# Patient Record
Sex: FEMALE | Race: WHITE | NOT HISPANIC OR LATINO | Employment: FULL TIME | URBAN - METROPOLITAN AREA
[De-identification: names, ages, dates, MRNs, and addresses within clinical notes are randomized per-mention and may not be internally consistent; named-entity substitution may affect disease eponyms.]

---

## 2023-03-31 ENCOUNTER — HOSPITAL ENCOUNTER (OUTPATIENT)
Dept: DATA CONVERSION | Facility: HOSPITAL | Age: 63
End: 2023-03-31
Attending: ORTHOPAEDIC SURGERY | Admitting: ORTHOPAEDIC SURGERY
Payer: COMMERCIAL

## 2023-03-31 DIAGNOSIS — M65.331 TRIGGER FINGER, RIGHT MIDDLE FINGER: ICD-10-CM

## 2023-03-31 DIAGNOSIS — D21.11 BENIGN NEOPLASM OF CONNECTIVE AND OTHER SOFT TISSUE OF RIGHT UPPER LIMB, INCLUDING SHOULDER: ICD-10-CM

## 2023-03-31 DIAGNOSIS — M65.341 TRIGGER FINGER, RIGHT RING FINGER: ICD-10-CM

## 2023-03-31 DIAGNOSIS — Z87.891 PERSONAL HISTORY OF NICOTINE DEPENDENCE: ICD-10-CM

## 2023-04-10 LAB
ATRIAL RATE: 70 BPM
P AXIS: 32 DEGREES
P OFFSET: 212 MS
P ONSET: 163 MS
PR INTERVAL: 124 MS
Q ONSET: 225 MS
QRS COUNT: 11 BEATS
QRS DURATION: 94 MS
QT INTERVAL: 410 MS
QTC CALCULATION(BAZETT): 442 MS
QTC FREDERICIA: 431 MS
R AXIS: 40 DEGREES
T AXIS: 34 DEGREES
T OFFSET: 430 MS
VENTRICULAR RATE: 70 BPM

## 2023-04-24 LAB
COMPLETE PATHOLOGY REPORT: NORMAL
CONVERTED CLINICAL DIAGNOSIS-HISTORY: NORMAL
CONVERTED FINAL DIAGNOSIS: NORMAL
CONVERTED FINAL REPORT PDF LINK TO COPY AND PASTE: NORMAL
CONVERTED GROSS DESCRIPTION: NORMAL

## 2023-09-14 NOTE — H&P
History & Physical Reviewed:   I have reviewed the History and Physical dated:  06-Mar-2023   History and Physical reviewed and relevant findings noted. Patient examined to review pertinent physical  findings.: No significant changes   Home Medications Reviewed: no changes noted   Allergies Reviewed: no changes noted     Consent:   COVID-19 Consent:  ·  COVID-19 Risk Consent Surgeon has reviewed key risks related to the risk of rachel COVID-19 and if they contract COVID-19 what the risks are.       Electronic Signatures:  Domingo Tran ()  (Signed 31-Mar-2023 07:29)   Authored: History & Physical Reviewed, Consent, Note  Completion      Last Updated: 31-Mar-2023 07:29 by Domingo Tran ()

## 2023-10-02 NOTE — OP NOTE
Post Operative Note:     Post-Procedure Diagnosis: Trigger finger to right  long and ring finger.  Right ring finger mass.   Procedure: 1.   2.   3.   4.   5.   Surgeon: Domingo rTan D.O.   Resident/Fellow/Other Assistant: ROLA Smith   Estimated Blood Loss (mL): Less than 10 cc   Specimen: yes   Findings: Trigger finger to right long and ring finger.   Right ring finger mass.     Operative Report Dictated:  Dictation: not applicable - note contains Operative  Report   Operative Report:    Preoperative diagnosis: Right long finger trigger finger.  Right ring finger trigger finger.  Right ring finger mass overlying A1 pulley.    Postoperative diagnosis: Same    Procedure planned: Right long finger trigger finger release.  Right ring finger trigger finger release.  Excision mass right ring finger, subfascial, measuring 4 mm in maximal dimension.    Procedure performed: Same    Surgeon: Domingo Tran D.O.    Asst.: ROLA Kim  The physician assistant was present to the entire case. Given the nature of the disease process and the procedure to be performed a skilled surgical assistant was necessary during the case. The assistant was necessary in order to hold retractors and directly  assist in the operation. A certified scrub tech was at the back table managing instruments and supplies for the surgical case.    Anesthesia type: Local field block performed with lidocaine with epinephrine solution buffered with bicarbonate monitored by the anesthesia team    Estimated blood loss: Less than 10 mL    Drains: None    Tourniquet: None    Specimens: Excised subfascial mass adherent to A1 pulley of right ring finger was sent for pathology    Implants: None    Indications for surgery: The patient presented with painful triggering of the right long finger.  The patient also had painful triggering to the right ring finger with associated mass in the palm overlying A1 pulley to ring finger.  The patient had  failure  of nonoperative treatment strategies. After full discussion regarding risks benefits and alternatives the patient elected to forego any additional nonoperative management in favor of right long and ring finger trigger finger release with excision mass.    Complications: None noted at time of surgery    Description of procedure: The patient was taken to the operative suite and placed in the supine position on the operating table.  A timeout was performed and the right hand was confirmed to be the operative site.  The patient was carefully positioned  on the table in such a fashion as to pad all bony prominences and peripheral nerves.  The patient was administered appropriate preoperative IV antibiotics.  The patient was then prepped and draped in the normal sterile fashion.  An oblique incision was  then marked out directly overlying the A1 pulley to the right long finger extending to overlie A1 pulley to ring finger.  The 15 blade was used to incise skin.  Starting with the long finger, the tenotomy scissors were used to gently dissect down through  the subcutaneous plane taking care to protect the neurovascular bundles both radially and ulnarly.  The proximal and distal boundaries of the A1 pulley were dissected out and directly visualized.  The tenotomy scissors were then used to release the A1  pulley within its midline.  The hypertrophic palmar aponeurotic pulley was also released within its midline under direct visualization.  Attention was then turned to the ring.  Again tenotomy scissors were used to dissect through the subcutaneous plane  exposing the palmar mass adherent to the A1 pulley of the right ring finger.  The 15 blade was used to incise the pulley at the margins of the mass excising portions of the pulley system and the mass itself.  The mass was sent for pathology.  The mass  itself was subfascial in nature and measured approximately 4 mm in maximal dimension.  Tenotomy scissors were then  used to release the remainder of the A1 pulley and the hypertrophic palmar aponeurotic pulley under direct visualization.  Ragnell retractors  were then used to independently traction FDS and FDP in order to perform traction tenolysis.  The patient was then asked to perform digital range of motion.  The patient was noted to have full range of motion with nice smooth gliding and no persistent  triggering.  Copious irrigation was then performed.  Interrupted nylon stitches were used to close the skin.  A bulky soft dressing was placed.  The patient was then allowed to head to recovery in stable condition.  Overall the patient tolerated the procedure  well.    Disposition: To PACU in stable condition        Electronic Signatures:  Domingo Tran ()  (Signed 31-Mar-2023 09:20)   Authored: Post Operative Note, Note Completion      Last Updated: 31-Mar-2023 09:20 by Domingo Tran ()

## 2023-10-25 PROBLEM — M25.561 ARTHRALGIA OF RIGHT KNEE: Status: ACTIVE | Noted: 2023-10-25

## 2023-10-25 PROBLEM — M65.351 TRIGGER FINGER OF ALL DIGITS OF RIGHT HAND: Status: ACTIVE | Noted: 2023-10-25

## 2023-10-25 PROBLEM — F17.211 CIGARETTE NICOTINE DEPENDENCE IN REMISSION: Status: ACTIVE | Noted: 2023-10-25

## 2023-10-25 PROBLEM — H81.10 BPPV (BENIGN PAROXYSMAL POSITIONAL VERTIGO): Status: ACTIVE | Noted: 2023-10-25

## 2023-10-25 PROBLEM — M65.321 TRIGGER FINGER OF ALL DIGITS OF RIGHT HAND: Status: ACTIVE | Noted: 2023-10-25

## 2023-10-25 PROBLEM — M54.30 SCIATICA: Status: ACTIVE | Noted: 2023-10-25

## 2023-10-25 PROBLEM — M65.341 TRIGGER FINGER OF ALL DIGITS OF RIGHT HAND: Status: ACTIVE | Noted: 2023-10-25

## 2023-10-25 PROBLEM — E55.9 VITAMIN D DEFICIENCY: Status: ACTIVE | Noted: 2023-10-25

## 2023-10-25 PROBLEM — K76.89 HEPATIC CYST: Status: ACTIVE | Noted: 2023-10-25

## 2023-10-25 PROBLEM — M65.311 TRIGGER FINGER OF ALL DIGITS OF RIGHT HAND: Status: ACTIVE | Noted: 2023-10-25

## 2023-10-25 PROBLEM — M65.331 TRIGGER FINGER OF ALL DIGITS OF RIGHT HAND: Status: ACTIVE | Noted: 2023-10-25

## 2023-10-25 PROBLEM — F41.9 ANXIETY: Status: ACTIVE | Noted: 2023-10-25

## 2023-10-25 PROBLEM — E78.5 HYPERLIPIDEMIA: Status: ACTIVE | Noted: 2023-10-25

## 2023-10-25 RX ORDER — CLOBETASOL PROPIONATE 0.5 MG/G
CREAM TOPICAL
COMMUNITY
Start: 2023-07-10 | End: 2024-01-17 | Stop reason: ENTERED-IN-ERROR

## 2023-10-25 RX ORDER — TRETINOIN 0.25 MG/G
CREAM TOPICAL
COMMUNITY

## 2023-10-25 RX ORDER — ACYCLOVIR 400 MG/1
1 TABLET ORAL EVERY 8 HOURS
COMMUNITY
Start: 2023-09-02 | End: 2024-01-17 | Stop reason: ENTERED-IN-ERROR

## 2023-10-25 RX ORDER — CELECOXIB 100 MG/1
1 CAPSULE ORAL 2 TIMES DAILY PRN
COMMUNITY
Start: 2023-06-20 | End: 2024-01-17 | Stop reason: ENTERED-IN-ERROR

## 2023-10-25 RX ORDER — CHOLECALCIFEROL (VITAMIN D3) 25 MCG
1000 TABLET ORAL DAILY
COMMUNITY

## 2023-10-25 RX ORDER — FAMOTIDINE 40 MG/1
1 TABLET, FILM COATED ORAL DAILY PRN
COMMUNITY

## 2023-10-25 RX ORDER — ACYCLOVIR 50 MG/G
CREAM TOPICAL
COMMUNITY
Start: 2023-06-20 | End: 2024-01-17 | Stop reason: ENTERED-IN-ERROR

## 2023-10-26 ENCOUNTER — OFFICE VISIT (OUTPATIENT)
Dept: ORTHOPEDIC SURGERY | Facility: CLINIC | Age: 63
End: 2023-10-26
Payer: COMMERCIAL

## 2023-10-26 DIAGNOSIS — M25.561 ARTHRALGIA OF RIGHT KNEE: Primary | ICD-10-CM

## 2023-10-26 PROCEDURE — 20610 DRAIN/INJ JOINT/BURSA W/O US: CPT | Performed by: ORTHOPAEDIC SURGERY

## 2023-10-26 NOTE — PROGRESS NOTES
History of Present Illness:  Patient returns today for an injection with viscosupplementation. The patient endorsing knee pain refractory to cortisone injections and oral medications    Review of Systems   GENERAL: Negative for malaise, significant weight loss, fever  MUSCULOSKELETAL: see HPI  NEURO:  Negative    Physical Examination:  Trace effusion  Tenderness over medial and lateral joint line    Assessment:  Patient with known osteoarthritis of the knee    Plan:  Visco injection provided, patient tolerated it well. See procedure note.    Injection performed as detailed in the procedure note below.       L Inj/Asp: R knee on 10/26/2023 11:15 AM  Indications: pain  Details: 22 G needle, anteromedial approach  Medications: 48 mg hylan 48 mg/6 mL  Outcome: tolerated well, no immediate complications  Procedure, treatment alternatives, risks and benefits explained, specific risks discussed. Consent was given by the patient. Immediately prior to procedure a time out was called to verify the correct patient, procedure, equipment, support staff and site/side marked as required. Patient was prepped and draped in the usual sterile fashion.

## 2023-11-14 DIAGNOSIS — M25.561 ARTHRALGIA OF RIGHT KNEE: ICD-10-CM

## 2023-12-01 ENCOUNTER — HOSPITAL ENCOUNTER (OUTPATIENT)
Dept: RADIOLOGY | Facility: CLINIC | Age: 63
Discharge: HOME | End: 2023-12-01
Payer: COMMERCIAL

## 2023-12-01 DIAGNOSIS — M25.561 ARTHRALGIA OF RIGHT KNEE: ICD-10-CM

## 2023-12-01 PROCEDURE — 73721 MRI JNT OF LWR EXTRE W/O DYE: CPT | Mod: RIGHT SIDE | Performed by: RADIOLOGY

## 2023-12-01 PROCEDURE — 73721 MRI JNT OF LWR EXTRE W/O DYE: CPT | Mod: RT

## 2023-12-06 ENCOUNTER — APPOINTMENT (OUTPATIENT)
Dept: RADIOLOGY | Facility: CLINIC | Age: 63
End: 2023-12-06
Payer: COMMERCIAL

## 2023-12-28 ENCOUNTER — APPOINTMENT (OUTPATIENT)
Dept: OBSTETRICS AND GYNECOLOGY | Facility: CLINIC | Age: 63
End: 2023-12-28
Payer: COMMERCIAL

## 2023-12-28 ENCOUNTER — TELEPHONE (OUTPATIENT)
Dept: ORTHOPEDIC SURGERY | Facility: CLINIC | Age: 63
End: 2023-12-28

## 2023-12-28 ENCOUNTER — OFFICE VISIT (OUTPATIENT)
Dept: ORTHOPEDIC SURGERY | Facility: CLINIC | Age: 63
End: 2023-12-28
Payer: COMMERCIAL

## 2023-12-28 DIAGNOSIS — M17.11 UNILATERAL PRIMARY OSTEOARTHRITIS, RIGHT KNEE: ICD-10-CM

## 2023-12-28 PROCEDURE — 99214 OFFICE O/P EST MOD 30 MIN: CPT | Performed by: ORTHOPAEDIC SURGERY

## 2023-12-28 NOTE — PROGRESS NOTES
History of Present Illness:  Patient with known osteoarthritis of the knee who presents today for repeat evaluation.  The patient notes persistent pain as well as some occasional mechanical symptoms.  The patient has tried the following modalities: ***.    ***    Review of Systems:   GENERAL: Negative for malaise, significant weight loss, fever  MUSCULOSKELETAL: see HPI  NEURO:  Negative    Physical Examination:  {RIGHT LEFT BILAT:08761} Knee:  Skin healthy and intact  No gross swelling or ecchymosis  Alignment: ***  Effusion: Trace  ROM:  ***  Crepitance with range of motion  No pain with internal rotation of the hip  Tenderness to palpation: over medial and lateral joint line and with patellar compression     No laxity to valgus stress  No laxity to varus stress  Negative Lachman´s test  Negative posterior drawer test  Mild pain with Juani´s test    Neurovascular exam normal distally  2+ DP pulse and good cap refill    Imaging:  Reviewed, degenerative joint disease noted ***    Assessment:  Patient with known osteoarthritis of the {right/left/bilateral:68675} knee    Plan:  We reviewed an evidence-based approach to OA of the knee.  We discussed past treatments as well options for future treatment.  We discussed NSAIDS (risks and benefits), low impact activities.   Discussed metal testing for Nickel allergy if she would like to receive MARIO assisted TKA. She is agreeable to this. We will proceed with metal studies, CT scan, and medical optimization prior to TKA.

## 2023-12-28 NOTE — TELEPHONE ENCOUNTER
12/28/23 - wheeled walker script along with detailed instructional letter mailed to patient for total knee surgery in feb 2024

## 2023-12-28 NOTE — PROGRESS NOTES
History of Present Illness:  Patient with known osteoarthritis of the knee who presents today for repeat evaluation.  The patient notes persistent pain as well as some occasional mechanical symptoms.  The patient has tried the following modalities: Rest, ice, anti-inflammatories as well as corticosteroid injections.    She is here today to discuss arthroplasty.    Review of Systems:   GENERAL: Negative for malaise, significant weight loss, fever  MUSCULOSKELETAL: see HPI  NEURO:  Negative    Physical Examination:  Right Knee:  Skin healthy and intact  No gross swelling or ecchymosis  Alignment: Mild varus  Effusion: Trace  ROM: Full  Crepitance with range of motion  No pain with internal rotation of the hip  Tenderness to palpation: over medial and lateral joint line and with patellar compression     No laxity to valgus stress  No laxity to varus stress  Negative Lachman´s test  Negative posterior drawer test  Mild pain with Juani´s test    Neurovascular exam normal distally  2+ DP pulse and good cap refill    Imaging:  Reviewed, degenerative joint disease noted moderate to severe    Assessment:  Patient with known osteoarthritis of the right knee    Plan:  We reviewed an evidence-based approach to OA of the knee.  We discussed past treatments as well options for future treatment.  We discussed NSAIDS (risks and benefits), low impact activities.   We reviewed total knee arthroplasty with the patient via robotic assisted versus standard.  Given the patient has a history of possible nickel allergy we did recommend nickel testing and/or transitioning to titanium/nickel free implant.  She opted for further evaluation with medical testing as she would like to use the robot for procedure and use a Bruce TriTanium implant.  I discussed that I would contact her primary care physician regarding this study.   We will hold off on proceeding with CT scan until we know results of this metal study.     Raoul Sheffield  TERRIE    In a face to face encounter, I evaluated the patient and performed a physical examination, discussed pertinent diagnostic studies if indicated and discussed diagnosis and management strategies with both the patient and physician assistant / nurse practitioner.  I reviewed the PA/NP's note and agree with the documented findings and plan of care.    Patient with excellent range of motion.  We discussed arthroplasty with her risk benefits as well as computer-assisted surgery.  We reviewed possibility of a nickel allergy with her.    Collins Ferrari MD

## 2023-12-29 ENCOUNTER — OFFICE VISIT (OUTPATIENT)
Dept: OBSTETRICS AND GYNECOLOGY | Facility: CLINIC | Age: 63
End: 2023-12-29
Payer: COMMERCIAL

## 2023-12-29 VITALS — WEIGHT: 135 LBS | BODY MASS INDEX: 26.37 KG/M2

## 2023-12-29 DIAGNOSIS — N64.4 BREAST PAIN, RIGHT: Primary | ICD-10-CM

## 2023-12-29 PROCEDURE — 1036F TOBACCO NON-USER: CPT | Performed by: OBSTETRICS & GYNECOLOGY

## 2023-12-29 PROCEDURE — 99213 OFFICE O/P EST LOW 20 MIN: CPT | Performed by: OBSTETRICS & GYNECOLOGY

## 2023-12-29 RX ORDER — IBUPROFEN 200 MG
200 TABLET ORAL EVERY 6 HOURS PRN
COMMUNITY
End: 2024-02-06 | Stop reason: HOSPADM

## 2023-12-29 RX ORDER — MAGNESIUM GLUCONATE 27 MG(500)
27 TABLET ORAL DAILY
COMMUNITY

## 2023-12-29 NOTE — PROGRESS NOTES
"Chief Complaint   Patient presents with    Breast Problem     Right Breast Pain        C/O intermittent \"burning\" under right breast; patient states it feels like it is \"throbbing\".    Chaperone declined.     Patient presents with 2-month history of intermittent right lateral breast pain.  \"Burning\" in nature.  Comes and goes and has periods of time when it is not present.  Has had breast reduction and area of pain is over the right lateral aspect of her breast reduction scar.    REVIEW OF SYSTEMS    Please see HPI for reported pertinent positives, which would supersede this ROS    Constitutional:  Denies fever, chills, wt gain or loss, fatigue    Genito-Urinary:  Denies genital lesion or sores, vaginal dryness, itching  or pain.  No abnormal vaginal discharge or unexplained vaginal bleeding.  No dysuria, urinary incontinence or frequency.  Denies pelvic pain, dysmenorrhea or dyspareunia.    Eyes:  Denies vision changes, dryness  ENT:  No hearing loss, sinus pain or congestion, nosebleeds  Cardiovascular:  No chest pain or palpitations  Respiratory:  No SOB, cough, wheezing  GI:  No Nausea, vomiting, diarrhea, constipation, abdominal pain  Musculoskeletal:  No new back pain. joint pain, peripheral edema  Skin:  No rash or skin lesion  Neurologic:  No HA, numbness or dizziness  Psychiatric:  No new anxiety or depression  Endocrine:  No loss of hair or hirsutism  Hematologic/lymphatic:  No swollen lymph nodes.  No reported tendency for easy bruising or bleeding    Patient admits to no other systemic complaints      PHYSICAL EXAM      PSYCH:  Pt is alert, oriented and cooperative    SKIN: warm, dry, w/o lesion    HEAD AND FACE:  External inspection of eyes, ears, functional cranial nerves normal and intact    THYROID:  No thyromegaly    CARDIOVASCULAR:  Warm and well Perfused    PULMONARY:  No respiratory distress    ABDOMEN:  soft, nontender.  No mass or organomegaly.    BREAST: No mass or lymphadenopathy " palpable.  In the right lateral aspect of the breast at 8:00 along the lines of her previous surgical scar is the area of tenderness.  Specifically over this area I can feel no mass, thickening, see any skin changes.    Assessment/Plan    Diagnoses and all orders for this visit:  Breast pain, right -today we discussed that, typically, intermittent breast pain that waxes and wanes he rarely health concerns such as a malignancy.  Her clinical exam today is unremarkable.  Patient can feel no mass either.  Area is not particularly tender to palpation today  We did discuss expectant management versus diagnostic imaging.  Patient feels she would like to expectantly manage over the next month but would like me to place the orders so she has ability to do this if it is persistent.  Orders placed.  -     BI mammo right diagnostic; Future  -     BI US breast limited right; Future

## 2024-01-05 ENCOUNTER — TRANSCRIBE ORDERS (OUTPATIENT)
Dept: ORTHOPEDIC SURGERY | Facility: CLINIC | Age: 64
End: 2024-01-05
Payer: COMMERCIAL

## 2024-01-05 ENCOUNTER — TELEPHONE (OUTPATIENT)
Dept: OBSTETRICS AND GYNECOLOGY | Facility: CLINIC | Age: 64
End: 2024-01-05
Payer: COMMERCIAL

## 2024-01-05 DIAGNOSIS — M25.561 ARTHRALGIA OF RIGHT KNEE: ICD-10-CM

## 2024-01-17 ENCOUNTER — LAB (OUTPATIENT)
Dept: LAB | Facility: LAB | Age: 64
End: 2024-01-17
Payer: COMMERCIAL

## 2024-01-17 ENCOUNTER — PRE-ADMISSION TESTING (OUTPATIENT)
Dept: PREADMISSION TESTING | Facility: HOSPITAL | Age: 64
End: 2024-01-17
Payer: COMMERCIAL

## 2024-01-17 VITALS
OXYGEN SATURATION: 96 % | SYSTOLIC BLOOD PRESSURE: 158 MMHG | DIASTOLIC BLOOD PRESSURE: 95 MMHG | WEIGHT: 137.79 LBS | TEMPERATURE: 98.1 F | BODY MASS INDEX: 27.05 KG/M2 | HEIGHT: 60 IN | HEART RATE: 88 BPM | RESPIRATION RATE: 16 BRPM

## 2024-01-17 DIAGNOSIS — Z01.818 PREOP EXAMINATION: ICD-10-CM

## 2024-01-17 DIAGNOSIS — Z01.818 PRE-OP TESTING: Primary | ICD-10-CM

## 2024-01-17 LAB
ALBUMIN SERPL BCP-MCNC: 4.3 G/DL (ref 3.4–5)
ALP SERPL-CCNC: 48 U/L (ref 33–136)
ALT SERPL W P-5'-P-CCNC: 21 U/L (ref 7–45)
ANION GAP SERPL CALC-SCNC: 13 MMOL/L (ref 10–20)
AST SERPL W P-5'-P-CCNC: 22 U/L (ref 9–39)
BASOPHILS # BLD AUTO: 0.09 X10*3/UL (ref 0–0.1)
BASOPHILS NFR BLD AUTO: 1 %
BILIRUB SERPL-MCNC: 0.4 MG/DL (ref 0–1.2)
BUN SERPL-MCNC: 13 MG/DL (ref 6–23)
CALCIUM SERPL-MCNC: 9.7 MG/DL (ref 8.6–10.3)
CHLORIDE SERPL-SCNC: 105 MMOL/L (ref 98–107)
CO2 SERPL-SCNC: 25 MMOL/L (ref 21–32)
CREAT SERPL-MCNC: 0.73 MG/DL (ref 0.5–1.05)
EGFRCR SERPLBLD CKD-EPI 2021: >90 ML/MIN/1.73M*2
EOSINOPHIL # BLD AUTO: 0.21 X10*3/UL (ref 0–0.7)
EOSINOPHIL NFR BLD AUTO: 2.2 %
ERYTHROCYTE [DISTWIDTH] IN BLOOD BY AUTOMATED COUNT: 12.9 % (ref 11.5–14.5)
GLUCOSE SERPL-MCNC: 84 MG/DL (ref 74–99)
HCT VFR BLD AUTO: 44.7 % (ref 36–46)
HGB BLD-MCNC: 14.6 G/DL (ref 12–16)
IMM GRANULOCYTES # BLD AUTO: 0.02 X10*3/UL (ref 0–0.7)
IMM GRANULOCYTES NFR BLD AUTO: 0.2 % (ref 0–0.9)
INR PPP: 0.9 (ref 0.9–1.1)
LYMPHOCYTES # BLD AUTO: 2.79 X10*3/UL (ref 1.2–4.8)
LYMPHOCYTES NFR BLD AUTO: 29.7 %
MCH RBC QN AUTO: 28.5 PG (ref 26–34)
MCHC RBC AUTO-ENTMCNC: 32.7 G/DL (ref 32–36)
MCV RBC AUTO: 87 FL (ref 80–100)
MONOCYTES # BLD AUTO: 0.84 X10*3/UL (ref 0.1–1)
MONOCYTES NFR BLD AUTO: 8.9 %
NEUTROPHILS # BLD AUTO: 5.45 X10*3/UL (ref 1.2–7.7)
NEUTROPHILS NFR BLD AUTO: 58 %
NRBC BLD-RTO: 0 /100 WBCS (ref 0–0)
PLATELET # BLD AUTO: 332 X10*3/UL (ref 150–450)
POTASSIUM SERPL-SCNC: 4.2 MMOL/L (ref 3.5–5.3)
PROT SERPL-MCNC: 6.7 G/DL (ref 6.4–8.2)
PROTHROMBIN TIME: 10.6 SECONDS (ref 9.8–12.8)
RBC # BLD AUTO: 5.13 X10*6/UL (ref 4–5.2)
SODIUM SERPL-SCNC: 139 MMOL/L (ref 136–145)
WBC # BLD AUTO: 9.4 X10*3/UL (ref 4.4–11.3)

## 2024-01-17 PROCEDURE — 93005 ELECTROCARDIOGRAM TRACING: CPT

## 2024-01-17 PROCEDURE — 93010 ELECTROCARDIOGRAM REPORT: CPT | Performed by: INTERNAL MEDICINE

## 2024-01-17 PROCEDURE — 85610 PROTHROMBIN TIME: CPT

## 2024-01-17 PROCEDURE — 36415 COLL VENOUS BLD VENIPUNCTURE: CPT

## 2024-01-17 PROCEDURE — 85025 COMPLETE CBC W/AUTO DIFF WBC: CPT

## 2024-01-17 PROCEDURE — 80053 COMPREHEN METABOLIC PANEL: CPT

## 2024-01-17 PROCEDURE — 99204 OFFICE O/P NEW MOD 45 MIN: CPT | Performed by: NURSE PRACTITIONER

## 2024-01-17 PROCEDURE — 87081 CULTURE SCREEN ONLY: CPT | Mod: STJLAB

## 2024-01-17 RX ORDER — ACETAMINOPHEN 500 MG
500 TABLET ORAL EVERY 6 HOURS PRN
COMMUNITY

## 2024-01-17 RX ORDER — CHLORHEXIDINE GLUCONATE ORAL RINSE 1.2 MG/ML
SOLUTION DENTAL
Qty: 473 ML | Refills: 0 | Status: SHIPPED | OUTPATIENT
Start: 2024-01-17 | End: 2024-02-06 | Stop reason: HOSPADM

## 2024-01-17 ASSESSMENT — PAIN - FUNCTIONAL ASSESSMENT: PAIN_FUNCTIONAL_ASSESSMENT: 0-10

## 2024-01-17 ASSESSMENT — ENCOUNTER SYMPTOMS
RESPIRATORY NEGATIVE: 1
CONSTITUTIONAL NEGATIVE: 1
CARDIOVASCULAR NEGATIVE: 1
ENDOCRINE NEGATIVE: 1
GASTROINTESTINAL NEGATIVE: 1
NEUROLOGICAL NEGATIVE: 1
NECK NEGATIVE: 1
EYES NEGATIVE: 1

## 2024-01-17 ASSESSMENT — CHADS2 SCORE
CHADS2 SCORE: 0
PRIOR STROKE OR TIA OR THROMBOEMBOLISM: NO
CHF: NO
AGE GREATER THAN OR EQUAL TO 75: NO
HYPERTENSION: NO
DIABETES: NO

## 2024-01-17 ASSESSMENT — DUKE ACTIVITY SCORE INDEX (DASI)
CAN YOU DO HEAVY WORK AROUND THE HOUSE LIKE SCRUBBING FLOORS OR LIFTING AND MOVING HEAVY FURNITURE: YES
TOTAL_SCORE: 50.7
CAN YOU PARTICIPATE IN MODERATE RECREATIONAL ACTIVITIES LIKE GOLF, BOWLING, DANCING, DOUBLES TENNIS OR THROWING A BASEBALL OR FOOTBALL: YES
CAN YOU PARTICIPATE IN STRENOUS SPORTS LIKE SWIMMING, SINGLES TENNIS, FOOTBALL, BASKETBALL, OR SKIING: NO
CAN YOU CLIMB A FLIGHT OF STAIRS OR WALK UP A HILL: YES
CAN YOU WALK A BLOCK OR TWO ON LEVEL GROUND: YES
CAN YOU TAKE CARE OF YOURSELF (EAT, DRESS, BATHE, OR USE TOILET): YES
CAN YOU WALK INDOORS, SUCH AS AROUND YOUR HOUSE: YES
CAN YOU RUN A SHORT DISTANCE: YES
CAN YOU DO YARD WORK LIKE RAKING LEAVES, WEEDING OR PUSHING A MOWER: YES
CAN YOU HAVE SEXUAL RELATIONS: YES
DASI METS SCORE: 9
CAN YOU DO LIGHT WORK AROUND THE HOUSE LIKE DUSTING OR WASHING DISHES: YES
CAN YOU DO MODERATE WORK AROUND THE HOUSE LIKE VACUUMING, SWEEPING FLOORS OR CARRYING GROCERIES: YES

## 2024-01-17 ASSESSMENT — LIFESTYLE VARIABLES: SMOKING_STATUS: NONSMOKER

## 2024-01-17 ASSESSMENT — PAIN SCALES - GENERAL: PAINLEVEL_OUTOF10: 0 - NO PAIN

## 2024-01-17 ASSESSMENT — ACTIVITIES OF DAILY LIVING (ADL): ADL_SCORE: 0

## 2024-01-17 NOTE — PREPROCEDURE INSTRUCTIONS
Medication List            Accurate as of January 17, 2024  2:35 PM. Always use your most recent med list.                acetaminophen 500 mg tablet  Commonly known as: Tylenol  Medication Adjustments for Surgery: Take morning of surgery with sip of water, no other fluids     CALCIUM CARBONATE-VIT D3-MIN ORAL  Medication Adjustments for Surgery: Stop 7 days before surgery     chlorhexidine 0.12 % solution  Commonly known as: Peridex  Sig: 15 mls swish and spit the night before surgery and 15mls swish and spit the morning of surgery do not swallow  Notes to patient: Take as instructed     cholecalciferol 25 MCG (1000 UT) tablet  Commonly known as: Vitamin D-3  Medication Adjustments for Surgery: Stop 7 days before surgery     famotidine 40 mg tablet  Commonly known as: Pepcid  Medication Adjustments for Surgery: Take morning of surgery with sip of water, no other fluids     ibuprofen 200 mg tablet  Medication Adjustments for Surgery: Stop 7 days before surgery     magnesium (as gluconate) 27 mg magnesium (500 mg) tablet  Commonly known as: Magonate  Medication Adjustments for Surgery: Continue until night before surgery     tretinoin 0.025 % cream  Commonly known as: Retin-A  Medication Adjustments for Surgery: Continue until night before surgery                        PRE-OPERATIVE INSTRUCTIONS    You will receive notification one business day prior to your surgery to confirm your arrival time and additional information. It is important that you answer your phone and/or check your messages during this time.    Please enter the building through the Outpatient entrance. Take the elevator off the lobby to the 2nd floor and check in at the Outpatient Surgery desk    INSTRUCTIONS:  Talk to your surgeon for instructions if you should stop your aspirin, blood thinner, or diabetes medicines.  DO NOT take any multivitamins or over the counter supplements for 7-10 days before surgery.  If not being admitted, you must have  an adult immediately available to drive you home after surgery. We also highly recommend you have someone stay with you for the entire day and night of your surgery.  For children having surgery, a parent or legal guardian must accompany t hem to the surgery center. If this is not possible, please call [phone] to make additional arrangements.  For adults who are unable to consent or make medical decisions for themselves, a legal guardian or Power of  must accompany them to the surgery center. If this is not possible, please call [phone] to make additional arrangements.  Wear comfortable, loose fitting clothing.  All jewelry and piercings must be removed. If you are unable to remove an item or have a dermal piercing, please be sure to tell the nurse when you arrive for surgery.  Nail polish and make-up must be removed.  Avoid smoking or consuming alcohol for 24 hours before surgery.  To help prevent infection, please take a shower/bath and wash your hair the night before and/or morning of surgery.    Additional instructions about eating and drinking before surgery:  Do not eat any solid foods after midnight. Milk, nutritional drinks/supplements, and infant formula are considered solid foods.  You may drink up to 12 oz. of clear liquids up to 2 hours before your arrival time for surgery, unless directed otherwise by your surgeon. Clear liquids include water, non-carbonated sports drinks (Gatorade), black tea or coffee (no creamers) and breast milk.    If you received a blue folder, please review additional information provided inside the folder regarding additional preparation.     If you have any questions or concerns, please call Pre-Admission Testing at [phone].

## 2024-01-17 NOTE — CPM/PAT H&P
CPM/PAT Evaluation       Name: Manuela Pop (Manuela Pop)  /Age: 1960/63 y.o.     In-Person       Chief Complaint: knee pain    HPI  This is a pleasant 62 yo with PmHx of GERD, OA, and right knee pain.  Pt states she has pain with navigating stairs, and after long days standing and walking.   She denies recent fever or chills.    Past Medical History:   Diagnosis Date    Anesthesia of skin 2022    Numbness and tingling    Encounter for screening for other viral diseases 2018    Need for hepatitis C screening test    GERD (gastroesophageal reflux disease)     Mastodynia 2021    Breast pain, left    Osteoarthritis     Other specified soft tissue disorders     Calf swelling    Personal history of other diseases of the musculoskeletal system and connective tissue 2022    History of arthritis       Past Surgical History:   Procedure Laterality Date    BREAST SURGERY  2015    Breast Surgery Reduction Procedure    HAND SURGERY      HERNIA REPAIR  2015    Hernia Repair    OTHER SURGICAL HISTORY  2015    Treatment Of The Left Foot    OTHER SURGICAL HISTORY  2015    Treatment Of The Right Foot    OTHER SURGICAL HISTORY  2022    Bunionectomy    OTHER SURGICAL HISTORY  2022    Carpal tunnel surgery    OTHER SURGICAL HISTORY  2022    Knee surgery       Patient  reports being sexually active and has had partner(s) who are male.    Family History   Problem Relation Name Age of Onset    Lung cancer Father      Pancreatic cancer Father's Sister      Cancer Other Multiple Family Members     Breast cancer Other Cousin        No Known Allergies    Prior to Admission medications    Medication Sig Start Date End Date Taking? Authorizing Provider   acetaminophen (Tylenol) 500 mg tablet Take 1 tablet (500 mg) by mouth every 6 hours if needed for mild pain (1 - 3).    Historical Provider, MD   calcium carb/vit D3/minerals (CALCIUM CARBONATE-VIT  D3-MIN ORAL) Take 1 tablet by mouth once daily.    Historical Provider, MD   chlorhexidine (Peridex) 0.12 % solution Sig: 15 mls swish and spit the night before surgery and 15mls swish and spit the morning of surgery do not swallow 1/17/24   Jenifer Dempsey, APRN-CNP   cholecalciferol (Vitamin D-3) 25 MCG (1000 UT) tablet Take 1 tablet (1,000 Units) by mouth once daily.    Historical Provider, MD   famotidine (Pepcid) 40 mg tablet Take 1 tablet (40 mg) by mouth once daily as needed for heartburn or indigestion.    Historical Provider, MD   ibuprofen 200 mg tablet Take 1 tablet (200 mg) by mouth every 6 hours if needed for mild pain (1 - 3).    Historical Provider, MD   magnesium, as gluconate, (Magonate) 27 mg magnesium (500 mg) tablet Take 1 tablet (27 mg) by mouth once daily.    Historical Provider, MD   tretinoin (Retin-A) 0.025 % cream Apply a pea sized amount to face every other night adding a night as tolerated for nightly use.  Cover with a moisturizer    Historical Provider, MD   acyclovir (Zovirax) 400 mg tablet Take 1 tablet (400 mg) by mouth every 8 hours. Take for 10 days 9/2/23 1/17/24  Historical Provider, MD   acyclovir (Zovirax) 5 % cream Apply topically 5 times a day. Apply to the affected area 6/20/23 1/17/24  Historical Provider, MD   celecoxib (CeleBREX) 100 mg capsule Take 1 capsule (100 mg) by mouth 2 times a day as needed (pain). 6/20/23 1/17/24  Historical Provider, MD   clobetasol (Temovate) 0.05 % cream Apply topically. 7/10/23 1/17/24  Historical Provider, MD   DAILY MULTI-VITAMIN ORAL Take 1 tablet by mouth once daily.  1/17/24  Historical Provider, MD        PAT ROS:   Constitutional:   neg    Neuro/Psych:   neg    Eyes:   neg    Ears:   neg    Nose:   neg    Mouth:   neg    Throat:   neg    Neck:   neg    Cardio:   neg    Respiratory:   neg    Endocrine:   neg    GI:   neg    :   neg    Musculoskeletal:    See HPI  Hematologic:   neg    Skin:  neg        Physical Exam  Constitutional:        Appearance: Normal appearance.   HENT:      Head: Normocephalic and atraumatic.      Nose: Nose normal.      Mouth/Throat:      Mouth: Mucous membranes are moist.   Eyes:      Pupils: Pupils are equal, round, and reactive to light.   Cardiovascular:      Rate and Rhythm: Normal rate and regular rhythm.   Pulmonary:      Effort: Pulmonary effort is normal.      Breath sounds: Normal breath sounds.   Abdominal:      General: Bowel sounds are normal.      Palpations: Abdomen is soft.   Musculoskeletal:         General: Normal range of motion.      Cervical back: Normal range of motion.   Skin:     General: Skin is warm and dry.   Neurological:      General: No focal deficit present.      Mental Status: She is alert and oriented to person, place, and time.   Psychiatric:         Mood and Affect: Mood normal.         Behavior: Behavior normal.        Airway full ROM  Anesthesia:  Patient denies any anesthesia complications. Slow to wake up after trigger finger  Teeth:  intact  ECG- NSR    Visit Vitals  BP (!) 158/95   Pulse 88   Temp 36.7 °C (98.1 °F)   Resp 16       DASI Risk Score      Flowsheet Row Most Recent Value   DASI SCORE 50.7   METS Score (Will be calculated only when all the questions are answered) 9          Caprini DVT Assessment      Flowsheet Row Most Recent Value   DVT Score 9   Current Status Elective major lower extremity arthroplasty   History Prior major surgery   Age 60-75 years   BMI 30 or less          Modified Frailty Index      Flowsheet Row Most Recent Value   Modified Frailty Index Calculator 0          CHADS2 Stroke Risk  Current as of 17 minutes ago        N/A 3 - 100%: High Risk   2 - 3%: Medium Risk   0 - 2%: Low Risk     Last Change: N/A          This score determines the patient's risk of having a stroke if the patient has atrial fibrillation.        This score is not applicable to this patient. Components are not calculated.          Revised Cardiac Risk Index      Flowsheet Row  Most Recent Value   Revised Cardiac Risk Calculator 0          Apfel Simplified Score    No data to display       Risk Analysis Index Results This Encounter         1/17/2024  1423             MCCRACKEN Cancer History: Patient does not indicate history of cancer    Total Risk Analysis Index Score Without Cancer: 18    Total Risk Analysis Index Score: 18          Stop Bang Score      Flowsheet Row Most Recent Value   Do you snore loudly? 0   Do you often feel tired or fatigued after your sleep? 0   Has anyone ever observed you stop breathing in your sleep? 0   Do you have or are you being treated for high blood pressure? 0   Recent BMI (Calculated) 26.9   Is BMI greater than 35 kg/m2? 0=No   Age older than 50 years old? 1=Yes   Is your neck circumference greater than 17 inches (Male) or 16 inches (Female)? 0   Gender - Male 0=No   STOP-BANG Total Score 1            Assessment and Plan:     Plan for OR on 2/5/2024 for Right TKR  CMP, INR, CBC with diff

## 2024-01-19 LAB — STAPHYLOCOCCUS SPEC CULT: NORMAL

## 2024-01-21 LAB
ATRIAL RATE: 73 BPM
P AXIS: 29 DEGREES
P OFFSET: 216 MS
P ONSET: 163 MS
PR INTERVAL: 124 MS
Q ONSET: 225 MS
QRS COUNT: 12 BEATS
QRS DURATION: 94 MS
QT INTERVAL: 386 MS
QTC CALCULATION(BAZETT): 425 MS
QTC FREDERICIA: 412 MS
R AXIS: 36 DEGREES
T AXIS: 35 DEGREES
T OFFSET: 418 MS
VENTRICULAR RATE: 73 BPM

## 2024-01-23 ENCOUNTER — APPOINTMENT (OUTPATIENT)
Dept: RADIOLOGY | Facility: HOSPITAL | Age: 64
End: 2024-01-23
Payer: COMMERCIAL

## 2024-02-02 DIAGNOSIS — Z96.659 S/P TOTAL KNEE ARTHROPLASTY, UNSPECIFIED LATERALITY: Primary | ICD-10-CM

## 2024-02-02 RX ORDER — SODIUM CHLORIDE, SODIUM LACTATE, POTASSIUM CHLORIDE, CALCIUM CHLORIDE 600; 310; 30; 20 MG/100ML; MG/100ML; MG/100ML; MG/100ML
100 INJECTION, SOLUTION INTRAVENOUS CONTINUOUS
Status: CANCELLED | OUTPATIENT
Start: 2024-02-02

## 2024-02-02 RX ORDER — TRANEXAMIC ACID 650 MG/1
1950 TABLET ORAL ONCE
Status: CANCELLED | OUTPATIENT
Start: 2024-02-02 | End: 2024-02-02

## 2024-02-02 RX ORDER — ACETAMINOPHEN 325 MG/1
650 TABLET ORAL ONCE
Status: CANCELLED | OUTPATIENT
Start: 2024-02-02 | End: 2024-02-02

## 2024-02-02 RX ORDER — CELECOXIB 200 MG/1
200 CAPSULE ORAL ONCE
Status: CANCELLED | OUTPATIENT
Start: 2024-02-02 | End: 2024-02-02

## 2024-02-02 RX ORDER — CEFAZOLIN SODIUM 2 G/50ML
2 SOLUTION INTRAVENOUS ONCE
Status: CANCELLED | OUTPATIENT
Start: 2024-02-02 | End: 2024-02-02

## 2024-02-05 ENCOUNTER — HOSPITAL ENCOUNTER (OUTPATIENT)
Facility: HOSPITAL | Age: 64
Discharge: HOME | End: 2024-02-06
Attending: ORTHOPAEDIC SURGERY | Admitting: ORTHOPAEDIC SURGERY
Payer: COMMERCIAL

## 2024-02-05 ENCOUNTER — ANESTHESIA EVENT (OUTPATIENT)
Dept: OPERATING ROOM | Facility: HOSPITAL | Age: 64
End: 2024-02-05
Payer: COMMERCIAL

## 2024-02-05 ENCOUNTER — ANESTHESIA (OUTPATIENT)
Dept: OPERATING ROOM | Facility: HOSPITAL | Age: 64
End: 2024-02-05
Payer: COMMERCIAL

## 2024-02-05 DIAGNOSIS — Z96.659 S/P TOTAL KNEE ARTHROPLASTY, UNSPECIFIED LATERALITY: Primary | ICD-10-CM

## 2024-02-05 DIAGNOSIS — M17.11 UNILATERAL PRIMARY OSTEOARTHRITIS, RIGHT KNEE: ICD-10-CM

## 2024-02-05 PROBLEM — Z96.651 TOTAL KNEE REPLACEMENT STATUS, RIGHT: Status: ACTIVE | Noted: 2024-02-05

## 2024-02-05 PROCEDURE — 2500000004 HC RX 250 GENERAL PHARMACY W/ HCPCS (ALT 636 FOR OP/ED): Performed by: NURSE ANESTHETIST, CERTIFIED REGISTERED

## 2024-02-05 PROCEDURE — 3700000001 HC GENERAL ANESTHESIA TIME - INITIAL BASE CHARGE: Performed by: ORTHOPAEDIC SURGERY

## 2024-02-05 PROCEDURE — 7100000011 HC EXTENDED STAY RECOVERY HOURLY - NURSING UNIT

## 2024-02-05 PROCEDURE — C1713 ANCHOR/SCREW BN/BN,TIS/BN: HCPCS | Performed by: ORTHOPAEDIC SURGERY

## 2024-02-05 PROCEDURE — 3600000018 HC OR TIME - INITIAL BASE CHARGE - PROCEDURE LEVEL SIX: Performed by: ORTHOPAEDIC SURGERY

## 2024-02-05 PROCEDURE — 2500000004 HC RX 250 GENERAL PHARMACY W/ HCPCS (ALT 636 FOR OP/ED): Performed by: STUDENT IN AN ORGANIZED HEALTH CARE EDUCATION/TRAINING PROGRAM

## 2024-02-05 PROCEDURE — 3700000002 HC GENERAL ANESTHESIA TIME - EACH INCREMENTAL 1 MINUTE: Performed by: ORTHOPAEDIC SURGERY

## 2024-02-05 PROCEDURE — 2500000001 HC RX 250 WO HCPCS SELF ADMINISTERED DRUGS (ALT 637 FOR MEDICARE OP): Performed by: STUDENT IN AN ORGANIZED HEALTH CARE EDUCATION/TRAINING PROGRAM

## 2024-02-05 PROCEDURE — 27447 TOTAL KNEE ARTHROPLASTY: CPT | Performed by: ORTHOPAEDIC SURGERY

## 2024-02-05 PROCEDURE — 2500000001 HC RX 250 WO HCPCS SELF ADMINISTERED DRUGS (ALT 637 FOR MEDICARE OP): Performed by: ORTHOPAEDIC SURGERY

## 2024-02-05 PROCEDURE — 2500000005 HC RX 250 GENERAL PHARMACY W/O HCPCS: Performed by: NURSE ANESTHETIST, CERTIFIED REGISTERED

## 2024-02-05 PROCEDURE — 2780000003 HC OR 278 NO HCPCS: Performed by: ORTHOPAEDIC SURGERY

## 2024-02-05 PROCEDURE — A27447 PR TOTAL KNEE ARTHROPLASTY: Performed by: ANESTHESIOLOGY

## 2024-02-05 PROCEDURE — 2720000007 HC OR 272 NO HCPCS: Performed by: ORTHOPAEDIC SURGERY

## 2024-02-05 PROCEDURE — 64447 NJX AA&/STRD FEMORAL NRV IMG: CPT | Performed by: ANESTHESIOLOGY

## 2024-02-05 PROCEDURE — 7100000001 HC RECOVERY ROOM TIME - INITIAL BASE CHARGE: Performed by: ORTHOPAEDIC SURGERY

## 2024-02-05 PROCEDURE — A27447 PR TOTAL KNEE ARTHROPLASTY: Performed by: NURSE ANESTHETIST, CERTIFIED REGISTERED

## 2024-02-05 PROCEDURE — 27447 TOTAL KNEE ARTHROPLASTY: CPT | Performed by: STUDENT IN AN ORGANIZED HEALTH CARE EDUCATION/TRAINING PROGRAM

## 2024-02-05 PROCEDURE — 2500000004 HC RX 250 GENERAL PHARMACY W/ HCPCS (ALT 636 FOR OP/ED): Performed by: ORTHOPAEDIC SURGERY

## 2024-02-05 PROCEDURE — 7100000002 HC RECOVERY ROOM TIME - EACH INCREMENTAL 1 MINUTE: Performed by: ORTHOPAEDIC SURGERY

## 2024-02-05 PROCEDURE — 3600000017 HC OR TIME - EACH INCREMENTAL 1 MINUTE - PROCEDURE LEVEL SIX: Performed by: ORTHOPAEDIC SURGERY

## 2024-02-05 PROCEDURE — 2500000005 HC RX 250 GENERAL PHARMACY W/O HCPCS: Performed by: ORTHOPAEDIC SURGERY

## 2024-02-05 PROCEDURE — C1776 JOINT DEVICE (IMPLANTABLE): HCPCS | Performed by: ORTHOPAEDIC SURGERY

## 2024-02-05 DEVICE — IMPLANTABLE DEVICE: Type: IMPLANTABLE DEVICE | Site: KNEE | Status: FUNCTIONAL

## 2024-02-05 DEVICE — IMPLANTABLE DEVICE
Type: IMPLANTABLE DEVICE | Site: KNEE | Status: NON-FUNCTIONAL
Brand: PERSONA™

## 2024-02-05 DEVICE — IMPLANTABLE DEVICE
Type: IMPLANTABLE DEVICE | Site: KNEE | Status: FUNCTIONAL
Brand: PERSONA® NATURAL TIBIA®

## 2024-02-05 DEVICE — IMPLANTABLE DEVICE
Type: IMPLANTABLE DEVICE | Site: KNEE | Status: FUNCTIONAL
Brand: BIOMET® BONE CEMENT R

## 2024-02-05 DEVICE — IMPLANTABLE DEVICE
Type: IMPLANTABLE DEVICE | Site: KNEE | Status: NON-FUNCTIONAL
Brand: NEXGEN®

## 2024-02-05 DEVICE — IMPLANTABLE DEVICE
Type: IMPLANTABLE DEVICE | Site: KNEE | Status: FUNCTIONAL
Brand: PERSONA® VIVACIT-E®

## 2024-02-05 RX ORDER — CYCLOBENZAPRINE HCL 10 MG
10 TABLET ORAL 3 TIMES DAILY PRN
Status: DISCONTINUED | OUTPATIENT
Start: 2024-02-05 | End: 2024-02-06 | Stop reason: HOSPADM

## 2024-02-05 RX ORDER — HYDROMORPHONE HYDROCHLORIDE 1 MG/ML
0.5 INJECTION, SOLUTION INTRAMUSCULAR; INTRAVENOUS; SUBCUTANEOUS EVERY 5 MIN PRN
Status: DISCONTINUED | OUTPATIENT
Start: 2024-02-05 | End: 2024-02-05 | Stop reason: HOSPADM

## 2024-02-05 RX ORDER — LABETALOL HYDROCHLORIDE 5 MG/ML
5 INJECTION, SOLUTION INTRAVENOUS ONCE AS NEEDED
Status: DISCONTINUED | OUTPATIENT
Start: 2024-02-05 | End: 2024-02-05 | Stop reason: HOSPADM

## 2024-02-05 RX ORDER — LIDOCAINE HYDROCHLORIDE 10 MG/ML
INJECTION INFILTRATION; PERINEURAL AS NEEDED
Status: DISCONTINUED | OUTPATIENT
Start: 2024-02-05 | End: 2024-02-05

## 2024-02-05 RX ORDER — FENTANYL CITRATE 50 UG/ML
INJECTION, SOLUTION INTRAMUSCULAR; INTRAVENOUS AS NEEDED
Status: DISCONTINUED | OUTPATIENT
Start: 2024-02-05 | End: 2024-02-05

## 2024-02-05 RX ORDER — MIDAZOLAM HYDROCHLORIDE 1 MG/ML
INJECTION, SOLUTION INTRAMUSCULAR; INTRAVENOUS AS NEEDED
Status: DISCONTINUED | OUTPATIENT
Start: 2024-02-05 | End: 2024-02-05

## 2024-02-05 RX ORDER — ONDANSETRON 4 MG/1
4 TABLET, ORALLY DISINTEGRATING ORAL EVERY 8 HOURS PRN
Status: DISCONTINUED | OUTPATIENT
Start: 2024-02-05 | End: 2024-02-06 | Stop reason: HOSPADM

## 2024-02-05 RX ORDER — KETOROLAC TROMETHAMINE 30 MG/ML
30 INJECTION, SOLUTION INTRAMUSCULAR; INTRAVENOUS EVERY 6 HOURS
Status: COMPLETED | OUTPATIENT
Start: 2024-02-05 | End: 2024-02-06

## 2024-02-05 RX ORDER — BISACODYL 5 MG
10 TABLET, DELAYED RELEASE (ENTERIC COATED) ORAL DAILY PRN
Status: DISCONTINUED | OUTPATIENT
Start: 2024-02-05 | End: 2024-02-06 | Stop reason: HOSPADM

## 2024-02-05 RX ORDER — PROCHLORPERAZINE MALEATE 10 MG
10 TABLET ORAL EVERY 6 HOURS PRN
Status: DISCONTINUED | OUTPATIENT
Start: 2024-02-05 | End: 2024-02-06 | Stop reason: HOSPADM

## 2024-02-05 RX ORDER — SODIUM CHLORIDE, SODIUM LACTATE, POTASSIUM CHLORIDE, CALCIUM CHLORIDE 600; 310; 30; 20 MG/100ML; MG/100ML; MG/100ML; MG/100ML
100 INJECTION, SOLUTION INTRAVENOUS CONTINUOUS
Status: DISCONTINUED | OUTPATIENT
Start: 2024-02-05 | End: 2024-02-06 | Stop reason: HOSPADM

## 2024-02-05 RX ORDER — TRANEXAMIC ACID 650 MG/1
1950 TABLET ORAL ONCE
Status: COMPLETED | OUTPATIENT
Start: 2024-02-05 | End: 2024-02-05

## 2024-02-05 RX ORDER — ACETAMINOPHEN 325 MG/1
975 TABLET ORAL ONCE
Status: DISCONTINUED | OUTPATIENT
Start: 2024-02-05 | End: 2024-02-05 | Stop reason: HOSPADM

## 2024-02-05 RX ORDER — OXYCODONE AND ACETAMINOPHEN 5; 325 MG/1; MG/1
1 TABLET ORAL EVERY 4 HOURS PRN
Status: DISCONTINUED | OUTPATIENT
Start: 2024-02-05 | End: 2024-02-05 | Stop reason: HOSPADM

## 2024-02-05 RX ORDER — LIDOCAINE HYDROCHLORIDE 10 MG/ML
0.1 INJECTION, SOLUTION EPIDURAL; INFILTRATION; INTRACAUDAL; PERINEURAL ONCE
Status: DISCONTINUED | OUTPATIENT
Start: 2024-02-05 | End: 2024-02-05 | Stop reason: HOSPADM

## 2024-02-05 RX ORDER — NALOXONE HYDROCHLORIDE 0.4 MG/ML
0.2 INJECTION, SOLUTION INTRAMUSCULAR; INTRAVENOUS; SUBCUTANEOUS EVERY 5 MIN PRN
Status: DISCONTINUED | OUTPATIENT
Start: 2024-02-05 | End: 2024-02-06 | Stop reason: HOSPADM

## 2024-02-05 RX ORDER — ACETAMINOPHEN 325 MG/1
650 TABLET ORAL EVERY 6 HOURS SCHEDULED
Status: DISCONTINUED | OUTPATIENT
Start: 2024-02-05 | End: 2024-02-06 | Stop reason: HOSPADM

## 2024-02-05 RX ORDER — BUPIVACAINE HYDROCHLORIDE 7.5 MG/ML
INJECTION, SOLUTION INTRASPINAL AS NEEDED
Status: DISCONTINUED | OUTPATIENT
Start: 2024-02-05 | End: 2024-02-05

## 2024-02-05 RX ORDER — DOCUSATE SODIUM 100 MG/1
100 CAPSULE, LIQUID FILLED ORAL 2 TIMES DAILY
Status: DISCONTINUED | OUTPATIENT
Start: 2024-02-05 | End: 2024-02-06 | Stop reason: HOSPADM

## 2024-02-05 RX ORDER — CELECOXIB 200 MG/1
200 CAPSULE ORAL ONCE
Status: COMPLETED | OUTPATIENT
Start: 2024-02-05 | End: 2024-02-05

## 2024-02-05 RX ORDER — ACETAMINOPHEN 325 MG/1
650 TABLET ORAL ONCE
Status: COMPLETED | OUTPATIENT
Start: 2024-02-05 | End: 2024-02-05

## 2024-02-05 RX ORDER — HYDRALAZINE HYDROCHLORIDE 20 MG/ML
5 INJECTION INTRAMUSCULAR; INTRAVENOUS EVERY 30 MIN PRN
Status: DISCONTINUED | OUTPATIENT
Start: 2024-02-05 | End: 2024-02-05 | Stop reason: HOSPADM

## 2024-02-05 RX ORDER — PROCHLORPERAZINE 25 MG/1
25 SUPPOSITORY RECTAL EVERY 12 HOURS PRN
Status: DISCONTINUED | OUTPATIENT
Start: 2024-02-05 | End: 2024-02-06 | Stop reason: HOSPADM

## 2024-02-05 RX ORDER — ALBUTEROL SULFATE 0.83 MG/ML
2.5 SOLUTION RESPIRATORY (INHALATION) ONCE AS NEEDED
Status: DISCONTINUED | OUTPATIENT
Start: 2024-02-05 | End: 2024-02-05 | Stop reason: HOSPADM

## 2024-02-05 RX ORDER — SODIUM CHLORIDE, SODIUM LACTATE, POTASSIUM CHLORIDE, CALCIUM CHLORIDE 600; 310; 30; 20 MG/100ML; MG/100ML; MG/100ML; MG/100ML
100 INJECTION, SOLUTION INTRAVENOUS CONTINUOUS
Status: DISCONTINUED | OUTPATIENT
Start: 2024-02-05 | End: 2024-02-05 | Stop reason: HOSPADM

## 2024-02-05 RX ORDER — OXYCODONE HYDROCHLORIDE 5 MG/1
5 TABLET ORAL EVERY 4 HOURS PRN
Status: DISCONTINUED | OUTPATIENT
Start: 2024-02-05 | End: 2024-02-05 | Stop reason: HOSPADM

## 2024-02-05 RX ORDER — PROPOFOL 10 MG/ML
INJECTION, EMULSION INTRAVENOUS CONTINUOUS PRN
Status: DISCONTINUED | OUTPATIENT
Start: 2024-02-05 | End: 2024-02-05

## 2024-02-05 RX ORDER — ROPIVACAINE/EPI/CLONIDINE/KET 2.46-0.005
SYRINGE (ML) INJECTION AS NEEDED
Status: DISCONTINUED | OUTPATIENT
Start: 2024-02-05 | End: 2024-02-05 | Stop reason: HOSPADM

## 2024-02-05 RX ORDER — DIPHENHYDRAMINE HYDROCHLORIDE 50 MG/ML
12.5 INJECTION INTRAMUSCULAR; INTRAVENOUS ONCE AS NEEDED
Status: DISCONTINUED | OUTPATIENT
Start: 2024-02-05 | End: 2024-02-05 | Stop reason: HOSPADM

## 2024-02-05 RX ORDER — ONDANSETRON HYDROCHLORIDE 2 MG/ML
4 INJECTION, SOLUTION INTRAVENOUS EVERY 8 HOURS PRN
Status: DISCONTINUED | OUTPATIENT
Start: 2024-02-05 | End: 2024-02-06 | Stop reason: HOSPADM

## 2024-02-05 RX ORDER — DIPHENHYDRAMINE HCL 12.5MG/5ML
12.5 LIQUID (ML) ORAL EVERY 6 HOURS PRN
Status: DISCONTINUED | OUTPATIENT
Start: 2024-02-05 | End: 2024-02-06 | Stop reason: HOSPADM

## 2024-02-05 RX ORDER — CEFAZOLIN SODIUM 2 G/100ML
2 INJECTION, SOLUTION INTRAVENOUS ONCE
Status: COMPLETED | OUTPATIENT
Start: 2024-02-05 | End: 2024-02-05

## 2024-02-05 RX ORDER — PROCHLORPERAZINE EDISYLATE 5 MG/ML
10 INJECTION INTRAMUSCULAR; INTRAVENOUS EVERY 6 HOURS PRN
Status: DISCONTINUED | OUTPATIENT
Start: 2024-02-05 | End: 2024-02-06 | Stop reason: HOSPADM

## 2024-02-05 RX ORDER — TRANEXAMIC ACID 650 MG/1
1950 TABLET ORAL ONCE
Status: COMPLETED | OUTPATIENT
Start: 2024-02-06 | End: 2024-02-06

## 2024-02-05 RX ORDER — SODIUM CHLORIDE, SODIUM LACTATE, POTASSIUM CHLORIDE, CALCIUM CHLORIDE 600; 310; 30; 20 MG/100ML; MG/100ML; MG/100ML; MG/100ML
50 INJECTION, SOLUTION INTRAVENOUS CONTINUOUS
Status: DISCONTINUED | OUTPATIENT
Start: 2024-02-05 | End: 2024-02-06 | Stop reason: HOSPADM

## 2024-02-05 RX ORDER — ASPIRIN 81 MG/1
81 TABLET ORAL 2 TIMES DAILY
Status: DISCONTINUED | OUTPATIENT
Start: 2024-02-05 | End: 2024-02-06 | Stop reason: HOSPADM

## 2024-02-05 RX ORDER — OXYCODONE HYDROCHLORIDE 5 MG/1
5 TABLET ORAL EVERY 4 HOURS PRN
Status: DISCONTINUED | OUTPATIENT
Start: 2024-02-05 | End: 2024-02-06 | Stop reason: HOSPADM

## 2024-02-05 RX ORDER — HYDROMORPHONE HYDROCHLORIDE 1 MG/ML
0.2 INJECTION, SOLUTION INTRAMUSCULAR; INTRAVENOUS; SUBCUTANEOUS EVERY 5 MIN PRN
Status: DISCONTINUED | OUTPATIENT
Start: 2024-02-05 | End: 2024-02-05 | Stop reason: HOSPADM

## 2024-02-05 RX ORDER — OXYCODONE HYDROCHLORIDE 10 MG/1
10 TABLET ORAL EVERY 4 HOURS PRN
Status: DISCONTINUED | OUTPATIENT
Start: 2024-02-05 | End: 2024-02-06 | Stop reason: HOSPADM

## 2024-02-05 RX ORDER — FAMOTIDINE 20 MG/1
40 TABLET, FILM COATED ORAL DAILY PRN
Status: DISCONTINUED | OUTPATIENT
Start: 2024-02-05 | End: 2024-02-06 | Stop reason: HOSPADM

## 2024-02-05 RX ORDER — CEFAZOLIN SODIUM 2 G/100ML
2 INJECTION, SOLUTION INTRAVENOUS EVERY 8 HOURS
Status: COMPLETED | OUTPATIENT
Start: 2024-02-05 | End: 2024-02-06

## 2024-02-05 RX ORDER — ONDANSETRON HYDROCHLORIDE 2 MG/ML
4 INJECTION, SOLUTION INTRAVENOUS ONCE AS NEEDED
Status: DISCONTINUED | OUTPATIENT
Start: 2024-02-05 | End: 2024-02-05 | Stop reason: HOSPADM

## 2024-02-05 RX ADMIN — LIDOCAINE HYDROCHLORIDE 3 ML: 10 INJECTION, SOLUTION INFILTRATION; PERINEURAL at 10:42

## 2024-02-05 RX ADMIN — CEFAZOLIN SODIUM 2 G: 2 INJECTION, SOLUTION INTRAVENOUS at 10:57

## 2024-02-05 RX ADMIN — DOCUSATE SODIUM 100 MG: 100 CAPSULE, LIQUID FILLED ORAL at 21:34

## 2024-02-05 RX ADMIN — MIDAZOLAM 2 MG: 1 INJECTION INTRAMUSCULAR; INTRAVENOUS at 11:11

## 2024-02-05 RX ADMIN — SODIUM CHLORIDE, POTASSIUM CHLORIDE, SODIUM LACTATE AND CALCIUM CHLORIDE: 600; 310; 30; 20 INJECTION, SOLUTION INTRAVENOUS at 10:42

## 2024-02-05 RX ADMIN — TRANEXAMIC ACID 1950 MG: 650 TABLET ORAL at 08:14

## 2024-02-05 RX ADMIN — FENTANYL CITRATE 50 MCG: 50 INJECTION, SOLUTION INTRAMUSCULAR; INTRAVENOUS at 10:59

## 2024-02-05 RX ADMIN — MIDAZOLAM 2 MG: 1 INJECTION INTRAMUSCULAR; INTRAVENOUS at 10:32

## 2024-02-05 RX ADMIN — ACETAMINOPHEN 650 MG: 325 TABLET ORAL at 18:47

## 2024-02-05 RX ADMIN — ASPIRIN 81 MG: 81 TABLET, COATED ORAL at 21:34

## 2024-02-05 RX ADMIN — SODIUM CHLORIDE, POTASSIUM CHLORIDE, SODIUM LACTATE AND CALCIUM CHLORIDE 100 ML/HR: 600; 310; 30; 20 INJECTION, SOLUTION INTRAVENOUS at 08:36

## 2024-02-05 RX ADMIN — SODIUM CHLORIDE, POTASSIUM CHLORIDE, SODIUM LACTATE AND CALCIUM CHLORIDE: 600; 310; 30; 20 INJECTION, SOLUTION INTRAVENOUS at 11:37

## 2024-02-05 RX ADMIN — CELECOXIB 200 MG: 200 CAPSULE ORAL at 08:14

## 2024-02-05 RX ADMIN — CEFAZOLIN SODIUM 2 G: 2 INJECTION, SOLUTION INTRAVENOUS at 18:47

## 2024-02-05 RX ADMIN — BUPIVACAINE HYDROCHLORIDE IN DEXTROSE 1.5 ML: 7.5 INJECTION, SOLUTION SUBARACHNOID at 10:44

## 2024-02-05 RX ADMIN — TRANEXAMIC ACID 1950 MG: 650 TABLET ORAL at 18:47

## 2024-02-05 RX ADMIN — FENTANYL CITRATE 50 MCG: 50 INJECTION, SOLUTION INTRAMUSCULAR; INTRAVENOUS at 10:42

## 2024-02-05 RX ADMIN — KETOROLAC TROMETHAMINE 30 MG: 30 INJECTION, SOLUTION INTRAMUSCULAR; INTRAVENOUS at 18:47

## 2024-02-05 RX ADMIN — SODIUM CHLORIDE, POTASSIUM CHLORIDE, SODIUM LACTATE AND CALCIUM CHLORIDE 50 ML/HR: 600; 310; 30; 20 INJECTION, SOLUTION INTRAVENOUS at 16:58

## 2024-02-05 RX ADMIN — ACETAMINOPHEN 650 MG: 325 TABLET ORAL at 08:13

## 2024-02-05 RX ADMIN — ONDANSETRON 4 MG: 4 TABLET, ORALLY DISINTEGRATING ORAL at 18:50

## 2024-02-05 RX ADMIN — PROPOFOL 100 MCG/KG/MIN: 10 INJECTION, EMULSION INTRAVENOUS at 10:46

## 2024-02-05 SDOH — SOCIAL STABILITY: SOCIAL INSECURITY: DO YOU FEEL ANYONE HAS EXPLOITED OR TAKEN ADVANTAGE OF YOU FINANCIALLY OR OF YOUR PERSONAL PROPERTY?: NO

## 2024-02-05 SDOH — SOCIAL STABILITY: SOCIAL INSECURITY: HAVE YOU HAD THOUGHTS OF HARMING ANYONE ELSE?: NO

## 2024-02-05 SDOH — HEALTH STABILITY: MENTAL HEALTH: CURRENT SMOKER: 0

## 2024-02-05 SDOH — SOCIAL STABILITY: SOCIAL INSECURITY: ABUSE: ADULT

## 2024-02-05 SDOH — SOCIAL STABILITY: SOCIAL INSECURITY: ARE YOU OR HAVE YOU BEEN THREATENED OR ABUSED PHYSICALLY, EMOTIONALLY, OR SEXUALLY BY ANYONE?: NO

## 2024-02-05 SDOH — SOCIAL STABILITY: SOCIAL INSECURITY: HAS ANYONE EVER THREATENED TO HURT YOUR FAMILY OR YOUR PETS?: NO

## 2024-02-05 SDOH — SOCIAL STABILITY: SOCIAL INSECURITY: DO YOU FEEL UNSAFE GOING BACK TO THE PLACE WHERE YOU ARE LIVING?: NO

## 2024-02-05 SDOH — SOCIAL STABILITY: SOCIAL INSECURITY: ARE THERE ANY APPARENT SIGNS OF INJURIES/BEHAVIORS THAT COULD BE RELATED TO ABUSE/NEGLECT?: NO

## 2024-02-05 SDOH — SOCIAL STABILITY: SOCIAL INSECURITY: WERE YOU ABLE TO COMPLETE ALL THE BEHAVIORAL HEALTH SCREENINGS?: YES

## 2024-02-05 SDOH — SOCIAL STABILITY: SOCIAL INSECURITY: DOES ANYONE TRY TO KEEP YOU FROM HAVING/CONTACTING OTHER FRIENDS OR DOING THINGS OUTSIDE YOUR HOME?: NO

## 2024-02-05 ASSESSMENT — COLUMBIA-SUICIDE SEVERITY RATING SCALE - C-SSRS
6. HAVE YOU EVER DONE ANYTHING, STARTED TO DO ANYTHING, OR PREPARED TO DO ANYTHING TO END YOUR LIFE?: NO
2. HAVE YOU ACTUALLY HAD ANY THOUGHTS OF KILLING YOURSELF?: NO
1. IN THE PAST MONTH, HAVE YOU WISHED YOU WERE DEAD OR WISHED YOU COULD GO TO SLEEP AND NOT WAKE UP?: NO

## 2024-02-05 ASSESSMENT — COGNITIVE AND FUNCTIONAL STATUS - GENERAL
PATIENT BASELINE BEDBOUND: NO
MOVING TO AND FROM BED TO CHAIR: A LITTLE
TOILETING: A LITTLE
MOVING TO AND FROM BED TO CHAIR: A LITTLE
STANDING UP FROM CHAIR USING ARMS: A LITTLE
TURNING FROM BACK TO SIDE WHILE IN FLAT BAD: A LITTLE
DRESSING REGULAR LOWER BODY CLOTHING: A LITTLE
STANDING UP FROM CHAIR USING ARMS: A LITTLE
DAILY ACTIVITIY SCORE: 20
HELP NEEDED FOR BATHING: A LITTLE
TOILETING: A LITTLE
CLIMB 3 TO 5 STEPS WITH RAILING: A LOT
MOBILITY SCORE: 18
TURNING FROM BACK TO SIDE WHILE IN FLAT BAD: A LITTLE
DAILY ACTIVITIY SCORE: 20
CLIMB 3 TO 5 STEPS WITH RAILING: A LITTLE
WALKING IN HOSPITAL ROOM: A LITTLE
WALKING IN HOSPITAL ROOM: A LITTLE
DRESSING REGULAR LOWER BODY CLOTHING: A LITTLE
HELP NEEDED FOR BATHING: A LITTLE
PERSONAL GROOMING: A LITTLE
MOBILITY SCORE: 19
PERSONAL GROOMING: A LITTLE

## 2024-02-05 ASSESSMENT — ACTIVITIES OF DAILY LIVING (ADL)
FEEDING YOURSELF: INDEPENDENT
WALKS IN HOME: INDEPENDENT
DRESSING YOURSELF: INDEPENDENT
JUDGMENT_ADEQUATE_SAFELY_COMPLETE_DAILY_ACTIVITIES: YES
TOILETING: INDEPENDENT
HEARING - RIGHT EAR: FUNCTIONAL
BATHING: INDEPENDENT
ADEQUATE_TO_COMPLETE_ADL: YES
HEARING - LEFT EAR: FUNCTIONAL
PATIENT'S MEMORY ADEQUATE TO SAFELY COMPLETE DAILY ACTIVITIES?: YES
GROOMING: INDEPENDENT
LACK_OF_TRANSPORTATION: NO

## 2024-02-05 ASSESSMENT — PAIN - FUNCTIONAL ASSESSMENT
PAIN_FUNCTIONAL_ASSESSMENT: 0-10

## 2024-02-05 ASSESSMENT — LIFESTYLE VARIABLES
AUDIT-C TOTAL SCORE: 2
HOW OFTEN DO YOU HAVE A DRINK CONTAINING ALCOHOL: 2-4 TIMES A MONTH
AUDIT-C TOTAL SCORE: 2
HOW MANY STANDARD DRINKS CONTAINING ALCOHOL DO YOU HAVE ON A TYPICAL DAY: 1 OR 2
SKIP TO QUESTIONS 9-10: 1
HOW OFTEN DO YOU HAVE 6 OR MORE DRINKS ON ONE OCCASION: NEVER

## 2024-02-05 ASSESSMENT — PAIN SCALES - GENERAL
PAINLEVEL_OUTOF10: 0 - NO PAIN
PAINLEVEL_OUTOF10: 1
PAINLEVEL_OUTOF10: 0 - NO PAIN

## 2024-02-05 ASSESSMENT — PATIENT HEALTH QUESTIONNAIRE - PHQ9
1. LITTLE INTEREST OR PLEASURE IN DOING THINGS: NOT AT ALL
2. FEELING DOWN, DEPRESSED OR HOPELESS: NOT AT ALL
SUM OF ALL RESPONSES TO PHQ9 QUESTIONS 1 & 2: 0

## 2024-02-05 NOTE — ANESTHESIA PROCEDURE NOTES
Spinal Block    Patient location during procedure: OR  Start time: 2/5/2024 10:50 AM  End time: 2/5/2024 10:57 AM  Reason for block: primary anesthetic  Staffing  Performed: [facility]   Authorized by: Del Agee MD    Performed by: ELEAZAR Dash    Preanesthetic Checklist  Completed: patient identified, IV checked, site marked, risks and benefits discussed, surgical consent, monitors and equipment checked, pre-op evaluation, timeout performed and sterile techniques followed  Block Timeout  RN/Licensed healthcare professional reads aloud to the Anesthesia provider and entire team: Patient identity, procedure with side and site, patient position, and as applicable the availability of implants/special equipment/special requirements.  Patient on coagulant treatment: no  Timeout performed at: 2/5/2024 10:49 AM  Spinal Block  Patient position: sitting  Prep: ChloraPrep  Sterility prep: drape, gloves, mask and hand hygiene  Sedation level: moderate sedation  Patient monitoring: blood pressure, continuous pulse oximetry, EKG, ETCO2 and heart rate  Approach: midline  Vertebral space: L3-4  Injection technique: single-shot  Needle  Needle type: pencil-point   Needle gauge: 25 G  Needle length: 3.5 in  Free flowing CSF: yes    Assessment  Block outcome: block to be assessed in the OR  Procedure assessment: patient tolerated procedure well with no immediate complications

## 2024-02-05 NOTE — DISCHARGE INSTRUCTIONS
"    Knee Replacement Discharge Instructions:   Dr. Collins Ferrari    Physical Therapy / Range of Motion:    Once you are discharged from the hospital, whether you go home or to a rehabilitation facility, you should have therapy. The minimum for a knee replacement is two-three times per week.  If you find that either at home or in a rehabilitation facility, you are not receiving the appropriate amount of therapy, please call our office immediately. Therapy CANNOT be postponed or delayed!  Therapy should be done by patient on days without appointments.    Knee replacements should ideally achieve 90 degrees of flexion by 2 weeks from surgery.  Ask your therapist after each session \"What is my RANGE OF MOTION?\"  Your physician will be asking you this question at your first post-operative appointment, and each following appointment.  It is also important to work on getting the knee straight and at rest attempt to keep the knee as straight as possible.    Discharge to rehab:  If you go to a rehabilitation facility, discharge to home is determined by the specific staff at the facility when they deem you are safe to return home.  Your surgeon and his staff are not allowed to make this decision. Please inquire with the facility therapist, , and medical personnel.      Discharge to home:  Eventually, therapy will progress to an outpatient setting (ideally around 2 weeks from surgery), where you physically go to a therapy facility, either here at [facility] or somewhere close to your home. This will typically continue for at least six weeks following your surgery.  In certain cases, this may be longer depending on your progress.     Medications:  You have been prescribed medication to prevent blood clots, please follow the instructions and dosage information you were given. Please wear the JIM hose stockings that you were given to help prevent blood clots for two weeks postoperatively, and do your " exercises after surgery as these will help reduce your risk of blood clots.     Prescriptions will be given to you upon discharge for pain medicine as well as blood thinning medication. Please remember to take your pain medication as directed. It is very important that you take your short-acting pain medication one hour before scheduled physical therapy. This will allow you to participate aggressively and achieve the necessary goals.     -Every effort must be made to prevent an infection in your artificial joint.  EVERY dentist or doctor that works with you should know that you have an artificial joint.  Antibiotics MUST be used before and after ANY dental procedure.    If you need a refill, please notify the office at LEAST 48 hours before you will be out of your medication. Some pain medications cannot be called in to a pharmacy and have to be printed on paper and picked up at the office. Any refills on pain medication need to be requested during clinic office hours, 8-5 on Mon-Fri.  We cannot refill pain medications on the weekend.    Wound Care:  Leave waterproof dressing in place.  As long as the Aquacel dressing is dry, intact, and occlusive at the borders, you may shower as the dressing is waterproof.     You may reinforce with other dressing materials as needed (gauze, ACE wraps, etc.) if drainage is noted, then please contact us.    May remove  the dressing at 7 days post-op.  If you are discharged to a rehab facility, the nursing staff may remove your Aquacel dressing at 7 days post-op.     You may shower normally, allowing water to run over the incision site, at ~14 days.  DO NOT RUB OR SCRUB INCISION. NO SOAKING IN A TUB OR STANDING WATER UNTIL INCISION IS WELL-HEALED AND SCABS HAVE DISAPPEARED.    -Do not apply any lotions, creams, ointments, peroxide, betadine or gels to incision and surrounding area.          -Apply ice to affected area as tolerated.      Driving:  Must be off of narcotic pain  medication and have good leg control! Approximately right le-6 weeks and left le weeks.  Please discuss with Dr. Ferrari at first post-op visit prior to resuming.     Follow-up Appointments:   Please call your surgeon's office if you are unsure about your post-op appointments. Your first post-operative appointment is made prior to surgery.     Prior, during, and after your surgery and hospital stay, please do not hesitate to call our office with any questions or concerns.    Our staff will be happy to assist you in any possible way during your personal journey through joint replacement.

## 2024-02-05 NOTE — OP NOTE
Operative Note     Date: 2024  OR Location: [facility] OR    Name: Manuela Pop, : 1960, Age: 64 y.o., MRN: [id number], Sex: female    Diagnosis  Pre-op Diagnosis     * Unilateral primary osteoarthritis, right knee [M17.11] Post-op Diagnosis     * Unilateral primary osteoarthritis, right knee [M17.11]     Procedures  Total Knee Arthroplasty  30496 - MA ARTHRP KNE CONDYLE&PLATU MEDIAL&LAT COMPARTMENTS  (Right)    Surgeons      * Collins Ferrari - Primary    Resident/Fellow/Other Assistant:  Surgeon(s) and Role:  Raoul Sheffield PA-C     Procedure Summary  Anesthesia: Spinal  ASA: II  Anesthesia Staff: Anesthesiologist: Del Agee MD  CRNA: KARINA Dash-CRNA  Estimated Blood Loss: 50 mL  Intra-op Medications:   Administrations occurring from 0945 to 1210 on 24:   Medication Name Total Dose   ropivacaine-epinephrine-clonidine-ketorolac 2.46-0.005- 0.0008-0.3mg/mL periarticular syringe 100 mL   lactated Ringer's infusion Cannot be calculated   ceFAZolin in dextrose (iso-os) (Ancef) IVPB 2 g 2 g              Anesthesia Record               Intraprocedure I/O Totals          Intake    Propofol Drip 0.00 mL    The total shown is the total volume documented since Anesthesia Start was filed.    lactated Ringer's infusion 2000.00 mL    Total Intake 2000 mL          Specimen: No specimens collected     Staff:   Circulator: Vern Barkley RN; Jordin Dinero RN  Relief Circulator: Simran Buenrostro RN  Relief Scrub: Amada Michael           Implants:    Implants       Type Name Action Serial No.      Screw DRILL PIN, TROCAR, HEADLESS - SN/A - [id number] Used, Not Implanted N/A     Joint SCREW, FEMALE 25MM, PSN 2.5MM - SN/A - [id number] Used, Not Implanted N/A     Joint Knee ARTICULATE SURFACE, PSN ASF UC, 10MM, VE R 3-7CD - [id number] Implanted      Joint Knee PATELLA, ALL POLY VE, 29MM - [id number] Implanted       PERSONA FEMUR CEMENTED CR STANDARD RIGHT SIZE 6 Implanted       PERSONA  NATURAL TIBIA 5DEGREE RIGHT SIZE D CEMENTED STEMMED Implanted               Findings: see procedure details    Indications:     The patient was seen in the preoperative area. The risks, benefits, complications, treatment options, non-operative alternatives, expected recovery and outcomes were discussed with the patient. The possibilities of reaction to medication, pulmonary aspiration, injury to surrounding structures, bleeding, recurrent infection, the need for additional procedures, failure to diagnose a condition, and creating a complication requiring transfusion or operation were discussed with the patient. The patient concurred with the proposed plan, giving informed consent.  The site of surgery was properly noted/marked if necessary per policy. The patient has been actively warmed in preoperative area. Preoperative antibiotics have been ordered and given within 1 hours of incision. Venous thrombosis prophylaxis have been ordered.    The patient failed multiple attempts at non-surgical management.  Specific to TKA we discussed the risks of infection / revision surgery, DVT/PE, medical complications, stiffness / loss of motion, neurologic (foot drop) or vascular injury.    Procedure Details:     Patient was met prior to surgery in pre-operative holding.  The appropriate extremity was marked and recent health status was reviewed and we found no contraindication to proceeding with the scheduled procedure.  We again reviewed the risks of infection, wound issues, deep venous thrombosis, pulmonary embolism, medical complications including cardiac and pulmonary, neurologic and vascular injury and incomplete relief of pre-operative pain.    The patient discussed regional anesthesia in pre-op holding.  The patient was transported to the operating room.  A thigh tourniquet was placed and a small bump on the buttock.  The patient was resting comfortably in a supine position with all fiordaliza prominences well padded.   Sequential compression device was placed on the contralateral lower extremity.  An exam under anesthesia was performed to evaluate the patient´s range of motion and ligamentous integrity.    The patient was prepped and draped in the usual sterile fashion.  The leg was placed in a well padded leg miller.  After prep, drape, antibiotic and time out, the leg was exsanguinated and the tourniquet inflated.  A longitudinal incision was made over the anterior knee.  The dissection was carried out through the skin and subcutaneous tissue.  A medial parapatellar arthrotomy was performed.  An effusion and synovitis was noted compatible with the grade IV changes of the articular cartilage.  The fat pad was slightly de-bulked, Pacific´s line was marked and some of the deep medial collateral ligament was released from the tibia.  The ACL was resected. The knee was flexed up and medial and lateral retractors were placed to protect the collateral ligaments and popliteus tendon.      Due to the degree of deformity and stiffness, the posterior cruciate ligament was resected.    A canal finder reamer was utilized to gain entry into the femur.  An intramedullary cristofer was placed by hand and set to 5 degrees of valgus for the distal femoral cut.  The cutting block was placed and the distal femoral cut was performed.   A sizing guide was then placed and the femoral size was measured based on posterior referencing.  The 4-in-1 cutting block was placed set to 3 degrees of external rotation.  The rotation was confirmed based on the transepicondylar axis and Pacific´s line.  There was no significant hypoplasia of the posterior condyles.     Once the cutting block was placed the cuts were performed: anterior, posterior and chamfer cuts.  There collaterals were protected and the bone was removed.  There was no notching of the femur.   Once the femoral cuts were complete, we turned our attention to the tibia.  Retractors were placed medial,  lateral and posteriorly.  An extramedullary alignment cristofer was used and the slope was set in accordance with the implant.  We measured 2 mm of resection from the lowest point on the tibia.  The tibial cut was completed with care not to encroach posterior to the knee joint.    After the tibial cut was completed, we removed the remaining menisci.  Using a curved osteotome posterior osteophytes were carefully removed from the femur.  We then assessed the flexion and extension gaps using trial spacer blocks.    The gaps appeared symmetric with a spacer and we proceeded to placing trial implants.  With symmetric gaps we progressed to placing trial implants.The final poly was a 10 UC.    A trial tibial component was placed with care to avoid overhang.  The rotation was set in line with the medial third of the tibial tubercle.  A trial femoral component was then placed, followed by a trial articular surface.  The knee was taken through range of motion with the provisional components.  The flexion and extension gaps were evaluated, as well as the patellar tracking and mid-flexion stability.  The following soft tissue balancing, recuts, and/or modifications were required: release of additional deep MCL off the tibia.      The patella was everted and ~9 mm of bone were removed from the thickest portion using a clamp/cutting guide.  The lug holes were drilled in the patella and femur.  The tibia was prepared with the drill and broach after confirmation of alignment using a drop cristofer.   The sclerotic areas of the bone were drilled using a small drill bit.  The capsule around the knee was injected using a long acting local anesthetic / multimodal pain mixture.    The cement was mixed in a vacuum sealed fashion while the bone was pulsatile lavaged.   The bone was dried and the implants were placed with a gentle posterior directed force and a clamp on the patella.  The excess cement was removed.   After the cement dried the gap  balancing was reassessed prior to placing the final articular surface.  The proud cement mantle was removed using a small osteotome.  The knee was then copiously lavaged with sterile saline and Irrisept (0.05% chlorhexidine gluconate).  The tourniquet was taken down and hemostasis was obtained using Bovie electrocautery and tranexamic acid (per protocol).  No significant bleeders were encountered and the usage of a drain was not felt to be necessary.    A layered closure was performed using 1 Vicryl and 1 Stratafix in the retinacular layer and quadriceps tendon.  2-0 vicryl in the deep dermal layer and monofilament in the skin.  An adherent, water proof dressing was placed followed by Webril and an ace bandage. All counts were reported as correct.  The patient was stable to the post anesthesia care unit.    I was present and participated in the entire procedure. The Physician Assistant participated in all critical elements of the procedure under my direct supervision. The surgical incision was closed by the PA under my indirect supervision. There were no qualified residents available to assist.    The physician assistant was present for the entire case.  Given the nature of the procedure and disease process, a skilled surgical assistant was necessary for the case.  The assistant was necessary for retraction and helped directly facilitate completion of the surgery.  A certified scrub tech was at the back table managing instruments and supplies for the surgical procedure.    Complications:  None; patient tolerated the procedure well.    Disposition: PACU - hemodynamically stable.  Condition: stable         Collins W Ferrari  Phone Number: [phone]

## 2024-02-05 NOTE — ANESTHESIA PROCEDURE NOTES
Peripheral Block    Patient location during procedure: pre-op  Start time: 2/5/2024 10:00 AM  End time: 2/5/2024 10:04 AM  Reason for block: at surgeon's request and post-op pain management  Staffing  Performed: attending   Authorized by: Del Agee MD    Performed by: Del Agee MD  Preanesthetic Checklist  Completed: patient identified, IV checked, site marked, risks and benefits discussed, surgical consent, monitors and equipment checked, pre-op evaluation and timeout performed   Timeout performed at: 2/5/2024 9:57 AM  Peripheral Block  Patient position: laying flat  Prep: ChloraPrep  Patient monitoring: continuous pulse ox  Block type: adductor canal  Laterality: right  Injection technique: single-shot  Guidance: ultrasound guided  Local infiltration: bupivicaine  Infiltration strength: 0.5 %  Dose: 20 mL  Needle  Needle type: short-bevel   Needle gauge: 21 G  Needle length: 8 cm  Needle localization: ultrasound guidance  Assessment  Injection assessment: negative aspiration for heme, no paresthesia on injection, incremental injection and local visualized surrounding nerve on ultrasound

## 2024-02-05 NOTE — H&P
History and physical is reviewed, patient re evaluated, no changes.  Procedure, risks, benefits, and postoperative course were discussed with the patient and consent is reviewed.    Collins Ferrari MD

## 2024-02-05 NOTE — ANESTHESIA PREPROCEDURE EVALUATION
Patient: Manuela Pop    Procedure Information       Date/Time: 02/05/24 0945    Procedure: Total Knee Arthroplasty (Right: Knee) - BLOCK PER TKA PROTOCOL    Location: [facility] OR  / [facility] OR    Surgeons: Collins Ferrari MD            Relevant Problems   Cardiovascular   (+) Hyperlipidemia      /Renal   (+) Hepatic cyst      Neuro/Psych   (+) Anxiety   (+) Sciatica      Musculoskeletal   (+) Unilateral primary osteoarthritis, right knee       Clinical information reviewed:   Tobacco  Allergies  Meds   Med Hx  Surg Hx  OB Status  Fam Hx  Soc   Hx        NPO Detail:  NPO/Void Status  Carbohydrate Drink Given Prior to Surgery? : N  Date of Last Liquid: 02/05/24  Time of Last Liquid: 0815  Date of Last Solid: 02/04/24  Time of Last Solid: 2000  Last Intake Type: Clear fluids  Time of Last Void: 0745         Physical Exam    Airway  Mallampati: II  TM distance: >3 FB     Cardiovascular   Rhythm: regular  Rate: normal     Dental - normal exam     Pulmonary   Breath sounds clear to auscultation     Abdominal            Anesthesia Plan    History of general anesthesia?: yes  History of complications of general anesthesia?: no    ASA 2     spinal     The patient is not a current smoker.    intravenous induction   Postoperative administration of opioids is intended.  Anesthetic plan and risks discussed with patient.    Plan discussed with CRNA.

## 2024-02-05 NOTE — PROGRESS NOTES
Physical Therapy                 Therapy Communication Note    Patient Name: Manuela Pop  MRN: [id number]  Today's Date: 2/5/2024   4120    Discipline: Physical Therapy    Missed Time: Attempt    Comment:  Pt POD # R TKA.  Attempted PT eval at 1535 and 1630, pt still with decreased sensation and minimal ability to wiggle toes.  Pt not appropriate for OOB mobility at this time.  Will re-attempt PT eval in the AM.

## 2024-02-05 NOTE — ANESTHESIA POSTPROCEDURE EVALUATION
Patient: Manuela Pop    Procedure Summary       Date: 02/05/24 Room / Location: [facility] 09 / [facility] OR    Anesthesia Start: 1042 Anesthesia Stop: 1232    Procedure: Total Knee Arthroplasty (Right: Knee) Diagnosis:       Unilateral primary osteoarthritis, right knee      (Unilateral primary osteoarthritis, right knee [M17.11])    Surgeons: Collins Ferrari MD Responsible Provider: Del Agee MD    Anesthesia Type: spinal ASA Status: 2            Anesthesia Type: spinal    Vitals Value Taken Time   /64 02/05/24 1230   Temp 36 °C (96.8 °F) 02/05/24 1225   Pulse 72 02/05/24 1238   Resp 12 02/05/24 1238   SpO2 96 % 02/05/24 1238   Vitals shown include unvalidated device data.    Anesthesia Post Evaluation    Patient location during evaluation: PACU  Patient participation: complete - patient participated  Level of consciousness: awake and alert  Pain management: satisfactory to patient  Airway patency: patent  Cardiovascular status: acceptable  Respiratory status: acceptable  Hydration status: acceptable  Postoperative Nausea and Vomiting: none        There were no known notable events for this encounter.

## 2024-02-06 ENCOUNTER — DOCUMENTATION (OUTPATIENT)
Dept: HOME HEALTH SERVICES | Facility: HOME HEALTH | Age: 64
End: 2024-02-06
Payer: COMMERCIAL

## 2024-02-06 ENCOUNTER — HOME HEALTH ADMISSION (OUTPATIENT)
Dept: HOME HEALTH SERVICES | Facility: HOME HEALTH | Age: 64
End: 2024-02-06
Payer: COMMERCIAL

## 2024-02-06 VITALS
TEMPERATURE: 97.9 F | SYSTOLIC BLOOD PRESSURE: 124 MMHG | DIASTOLIC BLOOD PRESSURE: 70 MMHG | HEART RATE: 71 BPM | RESPIRATION RATE: 17 BRPM | OXYGEN SATURATION: 95 % | BODY MASS INDEX: 27.05 KG/M2 | WEIGHT: 137.79 LBS | HEIGHT: 60 IN

## 2024-02-06 LAB
ANION GAP SERPL CALC-SCNC: 11 MMOL/L (ref 10–20)
BUN SERPL-MCNC: 11 MG/DL (ref 6–23)
CALCIUM SERPL-MCNC: 8.6 MG/DL (ref 8.6–10.3)
CHLORIDE SERPL-SCNC: 106 MMOL/L (ref 98–107)
CO2 SERPL-SCNC: 24 MMOL/L (ref 21–32)
CREAT SERPL-MCNC: 0.63 MG/DL (ref 0.5–1.05)
EGFRCR SERPLBLD CKD-EPI 2021: >90 ML/MIN/1.73M*2
ERYTHROCYTE [DISTWIDTH] IN BLOOD BY AUTOMATED COUNT: 12.9 % (ref 11.5–14.5)
GLUCOSE SERPL-MCNC: 119 MG/DL (ref 74–99)
HCT VFR BLD AUTO: 37.6 % (ref 36–46)
HGB BLD-MCNC: 12.3 G/DL (ref 12–16)
MCH RBC QN AUTO: 28.6 PG (ref 26–34)
MCHC RBC AUTO-ENTMCNC: 32.7 G/DL (ref 32–36)
MCV RBC AUTO: 87 FL (ref 80–100)
NRBC BLD-RTO: 0 /100 WBCS (ref 0–0)
PLATELET # BLD AUTO: 254 X10*3/UL (ref 150–450)
POTASSIUM SERPL-SCNC: 4 MMOL/L (ref 3.5–5.3)
RBC # BLD AUTO: 4.3 X10*6/UL (ref 4–5.2)
SODIUM SERPL-SCNC: 137 MMOL/L (ref 136–145)
WBC # BLD AUTO: 10.5 X10*3/UL (ref 4.4–11.3)

## 2024-02-06 PROCEDURE — 2500000005 HC RX 250 GENERAL PHARMACY W/O HCPCS: Performed by: ORTHOPAEDIC SURGERY

## 2024-02-06 PROCEDURE — 97116 GAIT TRAINING THERAPY: CPT | Mod: GP,CQ

## 2024-02-06 PROCEDURE — 99232 SBSQ HOSP IP/OBS MODERATE 35: CPT | Performed by: PHYSICIAN ASSISTANT

## 2024-02-06 PROCEDURE — 97110 THERAPEUTIC EXERCISES: CPT | Mod: GP,CQ

## 2024-02-06 PROCEDURE — 97116 GAIT TRAINING THERAPY: CPT | Mod: GP

## 2024-02-06 PROCEDURE — 7100000011 HC EXTENDED STAY RECOVERY HOURLY - NURSING UNIT

## 2024-02-06 PROCEDURE — 2500000004 HC RX 250 GENERAL PHARMACY W/ HCPCS (ALT 636 FOR OP/ED): Performed by: ORTHOPAEDIC SURGERY

## 2024-02-06 PROCEDURE — 97165 OT EVAL LOW COMPLEX 30 MIN: CPT | Mod: GO | Performed by: OCCUPATIONAL THERAPIST

## 2024-02-06 PROCEDURE — 85027 COMPLETE CBC AUTOMATED: CPT | Performed by: ORTHOPAEDIC SURGERY

## 2024-02-06 PROCEDURE — 80048 BASIC METABOLIC PNL TOTAL CA: CPT | Performed by: ORTHOPAEDIC SURGERY

## 2024-02-06 PROCEDURE — 2500000001 HC RX 250 WO HCPCS SELF ADMINISTERED DRUGS (ALT 637 FOR MEDICARE OP): Performed by: ORTHOPAEDIC SURGERY

## 2024-02-06 PROCEDURE — 36415 COLL VENOUS BLD VENIPUNCTURE: CPT | Performed by: ORTHOPAEDIC SURGERY

## 2024-02-06 PROCEDURE — 97161 PT EVAL LOW COMPLEX 20 MIN: CPT | Mod: GP

## 2024-02-06 RX ORDER — DOCUSATE SODIUM 100 MG/1
100 CAPSULE, LIQUID FILLED ORAL 2 TIMES DAILY
Qty: 20 CAPSULE | Refills: 0 | Status: SHIPPED | OUTPATIENT
Start: 2024-02-06 | End: 2024-02-16

## 2024-02-06 RX ORDER — ONDANSETRON 4 MG/1
4 TABLET, ORALLY DISINTEGRATING ORAL EVERY 8 HOURS PRN
Qty: 15 TABLET | Refills: 0 | Status: SHIPPED | OUTPATIENT
Start: 2024-02-06 | End: 2024-02-11

## 2024-02-06 RX ORDER — ASPIRIN 81 MG/1
81 TABLET ORAL 2 TIMES DAILY
Qty: 60 TABLET | Refills: 0 | Status: SHIPPED | OUTPATIENT
Start: 2024-02-06 | End: 2024-03-07

## 2024-02-06 RX ORDER — OXYCODONE HYDROCHLORIDE 5 MG/1
5 TABLET ORAL EVERY 6 HOURS PRN
Qty: 28 TABLET | Refills: 0 | Status: SHIPPED | OUTPATIENT
Start: 2024-02-06 | End: 2024-02-13

## 2024-02-06 RX ADMIN — KETOROLAC TROMETHAMINE 30 MG: 30 INJECTION, SOLUTION INTRAMUSCULAR; INTRAVENOUS at 01:10

## 2024-02-06 RX ADMIN — CEFAZOLIN SODIUM 2 G: 2 INJECTION, SOLUTION INTRAVENOUS at 03:02

## 2024-02-06 RX ADMIN — ACETAMINOPHEN 650 MG: 325 TABLET ORAL at 01:11

## 2024-02-06 RX ADMIN — ACETAMINOPHEN 650 MG: 325 TABLET ORAL at 11:27

## 2024-02-06 RX ADMIN — ACETAMINOPHEN 650 MG: 325 TABLET ORAL at 06:40

## 2024-02-06 RX ADMIN — OXYCODONE HYDROCHLORIDE 10 MG: 10 TABLET ORAL at 05:19

## 2024-02-06 RX ADMIN — KETOROLAC TROMETHAMINE 30 MG: 30 INJECTION, SOLUTION INTRAMUSCULAR; INTRAVENOUS at 06:42

## 2024-02-06 RX ADMIN — HYDROMORPHONE HYDROCHLORIDE 0.5 MG: 1 INJECTION, SOLUTION INTRAMUSCULAR; INTRAVENOUS; SUBCUTANEOUS at 03:31

## 2024-02-06 RX ADMIN — TRANEXAMIC ACID 1950 MG: 650 TABLET ORAL at 05:20

## 2024-02-06 RX ADMIN — OXYCODONE HYDROCHLORIDE 5 MG: 5 TABLET ORAL at 00:06

## 2024-02-06 RX ADMIN — OXYCODONE HYDROCHLORIDE 5 MG: 5 TABLET ORAL at 15:29

## 2024-02-06 RX ADMIN — DOCUSATE SODIUM 100 MG: 100 CAPSULE, LIQUID FILLED ORAL at 09:26

## 2024-02-06 RX ADMIN — ONDANSETRON 4 MG: 4 TABLET, ORALLY DISINTEGRATING ORAL at 11:08

## 2024-02-06 RX ADMIN — ASPIRIN 81 MG: 81 TABLET, COATED ORAL at 09:26

## 2024-02-06 RX ADMIN — OXYCODONE HYDROCHLORIDE 5 MG: 5 TABLET ORAL at 11:26

## 2024-02-06 RX ADMIN — KETOROLAC TROMETHAMINE 30 MG: 30 INJECTION, SOLUTION INTRAMUSCULAR; INTRAVENOUS at 12:26

## 2024-02-06 ASSESSMENT — COGNITIVE AND FUNCTIONAL STATUS - GENERAL
HELP NEEDED FOR BATHING: A LITTLE
TURNING FROM BACK TO SIDE WHILE IN FLAT BAD: A LITTLE
WALKING IN HOSPITAL ROOM: A LITTLE
DRESSING REGULAR LOWER BODY CLOTHING: A LITTLE
PERSONAL GROOMING: A LITTLE
TURNING FROM BACK TO SIDE WHILE IN FLAT BAD: A LITTLE
CLIMB 3 TO 5 STEPS WITH RAILING: A LITTLE
MOVING FROM LYING ON BACK TO SITTING ON SIDE OF FLAT BED WITH BEDRAILS: A LITTLE
DAILY ACTIVITIY SCORE: 19
STANDING UP FROM CHAIR USING ARMS: A LITTLE
CLIMB 3 TO 5 STEPS WITH RAILING: A LITTLE
TOILETING: A LITTLE
MOBILITY SCORE: 21
MOBILITY SCORE: 18
DRESSING REGULAR UPPER BODY CLOTHING: A LITTLE
MOVING TO AND FROM BED TO CHAIR: A LITTLE
MOVING FROM LYING ON BACK TO SITTING ON SIDE OF FLAT BED WITH BEDRAILS: A LITTLE

## 2024-02-06 ASSESSMENT — PAIN DESCRIPTION - ORIENTATION
ORIENTATION: RIGHT

## 2024-02-06 ASSESSMENT — PAIN SCALES - GENERAL
PAINLEVEL_OUTOF10: 8
PAINLEVEL_OUTOF10: 3
PAINLEVEL_OUTOF10: 5 - MODERATE PAIN
PAINLEVEL_OUTOF10: 5 - MODERATE PAIN
PAINLEVEL_OUTOF10: 3
PAINLEVEL_OUTOF10: 5 - MODERATE PAIN
PAINLEVEL_OUTOF10: 4
PAINLEVEL_OUTOF10: 2
PAINLEVEL_OUTOF10: 2
PAINLEVEL_OUTOF10: 4
PAINLEVEL_OUTOF10: 7
PAINLEVEL_OUTOF10: 4
PAINLEVEL_OUTOF10: 4
PAINLEVEL_OUTOF10: 10 - WORST POSSIBLE PAIN

## 2024-02-06 ASSESSMENT — PAIN DESCRIPTION - LOCATION
LOCATION: KNEE

## 2024-02-06 ASSESSMENT — ACTIVITIES OF DAILY LIVING (ADL): ADL_ASSISTANCE: INDEPENDENT

## 2024-02-06 NOTE — NURSING NOTE
Pt discharge teaching completed. Pt & pt's  voiced understanding.      IV removed    Pt medicated with pain pill before she left     /70  HR 79  Sp02 93%    Pt voiding before she heads down to the car to return home with

## 2024-02-06 NOTE — HH CARE COORDINATION
Home Care received a Referral for Physical Therapy and Occupational Therapy. We have processed the referral for a Start of Care on 2/7/24.     If you have any questions or concerns, please feel free to contact us at [phone]. Follow the prompts, enter your five digit zip code, and you will be directed to your care team on [facility].

## 2024-02-06 NOTE — PROGRESS NOTES
Occupational Therapy    Evaluation    Patient Name: Manuela Pop  MRN: [id number]  Today's Date: 2/6/2024  Time Calculation  Start Time: 0943  Stop Time: 1007  Time Calculation (min): 24 min  Eval only     Assessment  IP OT Assessment  Prognosis: Good  Medical Staff Made Aware: Yes  End of Session Communication: Bedside nurse  End of Session Patient Position: Up in chair, Alarm on  Patient presents with decline in ADLs, functional transfers, functional mobility and would benefit from OT during acute stay to improve functional independence and safety. Recommend low intensity OT to maximize functional independence and safety.     Plan:  Treatment Interventions: ADL retraining, Functional transfer training, Equipment evaluation/education, Patient/family training, Compensatory technique education  OT Frequency: Daily  OT Discharge Recommendations: Low intensity level of continued care  Equipment Recommended upon Discharge: Wheeled walker (shower chair, reacher, sock aide, LH shoe horn)  OT - OK to Discharge: Yes from acute care OT services to the next level of care when cleared by medical team      Subjective   Current Problem:  1. S/P total knee arthroplasty, unspecified laterality  Referral to Home Health    aspirin 81 mg EC tablet    docusate sodium (Colace) 100 mg capsule    ondansetron ODT (Zofran-ODT) 4 mg disintegrating tablet    oxyCODONE (Roxicodone) 5 mg immediate release tablet      2. Unilateral primary osteoarthritis, right knee            General:  General  Reason for Referral: TKA  Referred By: Collins Ferrari MD  Past Medical History Relevant to Rehab: Admitted 2/5/2024 after having right TKA completed by Dr Ferrari.  PMH: HLD, anxiety, sciatica  Co-Treatment: PT  Co-Treatment Reason: for safety  Prior to Session Communication: Bedside nurse  Patient Position Received: Bed, 3 rail up, Alarm on  Preferred Learning Style: verbal  General Comment: Patient in long legged sitting in bed and agreeable to  participate in OT evaluation.    Precautions:  LE Weight Bearing Status: Weight Bearing as Tolerated  Medical Precautions: Fall precautions    Pain:  Pain Assessment  Pain Assessment: 0-10  Pain Score: 5 - Moderate pain  Pain Type: Surgical pain  Pain Location: Knee  Pain Interventions: Rest    Objective   Cognition:  Overall Cognitive Status: Within Functional Limits    Home Living:  Home Living Comments: Lives in 1 story home with spouse with 2 JC with no railing. Has tub shower with shower bench.     Prior Function:  Prior Function Comments: Was independent with all ADLs. Shared IADLs. Spouse will be home to assist. Denies any falls int he past 6 months    ADL:  LE Dressing Assistance: Minimal (don underwear and pants)  ADL Comments: Educated patient on safety and compensatory strategies for lower body dressing. Patient verbalized understanding    Activity Tolerance:  Endurance: Endurance does not limit participation in activity    Bed Mobility/Transfers:   Bed Mobility  Bed Mobility:  (SBA supine to sit at edge of bed)  Transfers  Transfer:  (SBA sit <>stand with min verbal cues for proper hand placement and technique.  SBA with WW functional mobility task)  Educated patient on safety with car transfers and patient verbalized understanding.    Educated patient on safety and use of shower chair for safety and patient verbalized understanding. Educated patient on having [facility] assess shower safety prior to completing first shower and patient verbalized understanding.     Extremities: RUE   RUE : Within Functional Limits and LUE   LUE: Within Functional Limits    Outcome Measures: [facility] Daily Activity  Putting on and taking off regular lower body clothing: A little  Bathing (including washing, rinsing, drying): A little  Putting on and taking off regular upper body clothing: A little  Toileting, which includes using toilet, bedpan or urinal: A little  Taking care of personal grooming such as brushing teeth: A  little  Eating Meals: None  Daily Activity - Total Score: 19    EDUCATION:  Education  Individual(s) Educated: Patient  Education Provided: Fall precautons (safety and comp strategies with lower body dressing, safety wtih shower transfers and car transfers)  Patient Response to Education: Patient/Caregiver Verbalized Understanding of Information    Goals:   Encounter Problems       Encounter Problems (Active)       Balance       STG-Patient will be independent with assistive device dynamic stand task >5 minutes for ADL completion   (Progressing)       Start:  02/06/24    Expected End:  02/20/24               Dressings Lower Extremities       STG - Patient will complete lower body dressing independently with AE and comp strategies  (Progressing)       Start:  02/06/24    Expected End:  02/20/24               Mobility       STG-Patient will be independent with assistive device functional mobility tasks   (Progressing)       Start:  02/06/24    Expected End:  02/20/24               Transfers       STG - Patient will perform car transfers independently demonstrating good safety  (Progressing)       Start:  02/06/24    Expected End:  02/20/24            STG-Patient will be independent with functional transfers demonstrating good safety   (Progressing)       Start:  02/06/24    Expected End:  02/20/24

## 2024-02-06 NOTE — PROGRESS NOTES
Physical Therapy    Physical Therapy Evaluation    Patient Name: Manuela Pop  MRN: [id number]  Today's Date: 2/6/2024   Time Calculation  Start Time: 0942  Stop Time: 1007  Time Calculation (min): 25 min    Assessment/Plan   PT Assessment  PT Assessment Results: Decreased strength, Decreased range of motion, Decreased mobility  Rehab Prognosis: Excellent  Evaluation/Treatment Tolerance: Patient limited by pain, Patient tolerated treatment well  Medical Staff Made Aware: Yes  End of Session Communication: Bedside nurse  Assessment Comment: Pt presents today below baseline level of function and requires continued PT during hospital stay. Pt requires PT at a low intensity level at discharge to maximize functional mobility and safety.    End of Session Patient Position: Up in chair, Alarm on  IP OR SWING BED PT PLAN  Inpatient or Swing Bed: Inpatient  PT Plan  Treatment/Interventions: Bed mobility, Transfer training, Gait training, Balance training, Neuromuscular re-education, Strengthening, Endurance training, Range of motion, Therapeutic exercise, Therapeutic activity, Home exercise program, Stair training  PT Plan: Skilled PT  PT Frequency: BID  PT Discharge Recommendations: Low intensity level of continued care  Equipment Recommended upon Discharge:  (cane)  PT Recommended Transfer Status: Assist x1 (FWW, gait belt)  PT - OK to Discharge: Yes (To next level of care when cleared by medical team   )    Subjective         General Visit Information:  General  Reason for Referral: TKA  Referred By: Collins Ferrari MD  Past Medical History Relevant to Rehab: admitted 2/5/34 following completion of right TKA. PMH: HLD, anxiety, sciatica  Family/Caregiver Present: No  Co-Treatment: OT  Co-Treatment Reason: to maximize pt safety and functional mobility  Prior to Session Communication: Bedside nurse  Patient Position Received: Bed, 3 rail up, Alarm on  Preferred Learning Style: verbal  General Comment: Pt agreeable to  PT, nursing cleared for treatment.    Home Living:  Home Living  Home Adaptive Equipment: Walker rolling or standard  Home Living Comments: Lives in 1 story home with spouse with 2 JC with no railing. Has tub shower with shower bench.    Prior Level of Function:  Prior Function Per Pt/Caregiver Report  ADL Assistance: Independent  Homemaking Assistance: Independent  Ambulatory Assistance: Independent  Prior Function Comments: denies any falls in the past 6 months    Precautions:  Precautions  LE Weight Bearing Status: Weight Bearing as Tolerated  Medical Precautions: Fall precautions  Post-Surgical Precautions: Right total knee precautions    Vital Signs:     Objective     Pain:  Pain Assessment  Pain Assessment: 0-10  Pain Score: 5 - Moderate pain  Pain Type: Surgical pain  Pain Location: Knee    Cognition:  Cognition  Overall Cognitive Status: Within Functional Limits  Orientation Level: Oriented X4    General Assessments:      Activity Tolerance  Endurance: Endurance does not limit participation in activity  Sensation  Sensation Comment: reports some numbness and tingling in right thigh              Static Sitting Balance  Static Sitting-Comment/Number of Minutes: good  Dynamic Sitting Balance  Dynamic Sitting-Comments: good  Static Standing Balance  Static Standing-Comment/Number of Minutes: fair  Dynamic Standing Balance  Dynamic Standing-Comments: fair    Functional Assessments:     Bed Mobility  Bed Mobility: Yes  Bed Mobility 1  Bed Mobility 1: Supine to sitting  Level of Assistance 1: Close supervision  Transfers  Transfer: Yes  Transfer 1  Transfer From 1: Sit to  Transfer to 1: Stand  Transfer Device 1: Walker  Transfer Level of Assistance 1: Close supervision  Transfers 2  Transfer From 2: Stand to  Transfer to 2: Sit  Transfer Device 2: Walker  Transfer Level of Assistance 2: Close supervision  Ambulation/Gait Training  Ambulation/Gait Training Performed: Yes  Ambulation/Gait Training 1  Device 1:  Rolling walker  Gait Support Devices: Gait belt  Assistance 1: Close supervision  Quality of Gait 1: Inconsistent stride length, Decreased step length, Antalgic (step to gait progressing to step through)  Comments/Distance (ft) 1: 80 feet x 2, limited by pain and fatigue        Treatment    Cues for safe hand placement with use of FWW for sit<>stand. Instruction provided in step to gait pattern with cues given for sequencing with FWW. Cues given for turning strategy to avoid pivoting on right LE. I    Instruction provided in ankle pumps, quad sets, glute sets in order to maximize strength and circulation. Cues given for proper technique and parameters.     Extremity/Trunk Assessments:        RLE   RLE : Exceptions to WFL  AROM RLE (degrees)  RLE AROM Comment: WFL except knee impaired  R Knee Flexion 0-130: 70  R Knee Extension 0-130:  (10 from 0)  Strength RLE  RLE Overall Strength: Greater than or equal to 3/5 as evidenced by functional mobility  LLE   LLE : Within Functional Limits    Outcome Measures:  [facility] Basic Mobility  Turning from your back to your side while in a flat bed without using bedrails: A little  Moving from lying on your back to sitting on the side of a flat bed without using bedrails: A little  Moving to and from bed to chair (including a wheelchair): A little  Standing up from a chair using your arms (e.g. wheelchair or bedside chair): A little  To walk in hospital room: A little  Climbing 3-5 steps with railing: A little  Basic Mobility - Total Score: 18                            Goals:  Encounter Problems          PT Problem       Pt will complete sit <> stand and bed <> chair transfers with mod I.        Start:  02/06/24    Expected End:  02/20/24            Pt will ambulate 300 feet mod I using FWW with no significant gait deviations.         Start:  02/06/24    Expected End:  02/20/24            Pt will ascend/descend at least 2 stairs SBA in order to navigate home environment.          Start:  02/06/24    Expected End:  02/20/24            Pt will verbalize and demonstrate understanding of TKA supine/seated exercises.        Start:  02/06/24    Expected End:  02/20/24                   Education Documentation  Precautions, taught by Mago Queen PT at 2/6/2024  4:16 PM.  Learner: Patient  Readiness: Acceptance  Method: Explanation  Response: Verbalizes Understanding, Demonstrated Understanding    Body Mechanics, taught by Mago Queen PT at 2/6/2024  4:16 PM.  Learner: Patient  Readiness: Acceptance  Method: Explanation  Response: Verbalizes Understanding, Demonstrated Understanding    Home Exercise Program, taught by Mago Queen PT at 2/6/2024  4:16 PM.  Learner: Patient  Readiness: Acceptance  Method: Explanation  Response: Verbalizes Understanding, Demonstrated Understanding    Mobility Training, taught by Mago Queen PT at 2/6/2024  4:16 PM.  Learner: Patient  Readiness: Acceptance  Method: Explanation  Response: Verbalizes Understanding, Demonstrated Understanding    Education Comments  No comments found.

## 2024-02-06 NOTE — PROGRESS NOTES
Physical Therapy    Physical Therapy    Physical Therapy Treatment    Patient Name: Manuela Pop  MRN: [id number]  Today's Date: 2/6/2024  Time Calculation  Start Time: 1222  Stop Time: 1300  Time Calculation (min): 38 min       Assessment/Plan   PT Assessment  PT Assessment Results: Decreased strength, Decreased range of motion, Decreased mobility  Rehab Prognosis: Excellent  End of Session Communication: Bedside nurse  End of Session Patient Position: Up in chair, Alarm on  PT Plan  Inpatient/Swing Bed or Outpatient: Inpatient        02/06/24 1224   PT  Visit   PT Received On 02/06/24   Response to Previous Treatment Patient with no complaints from previous session.   General   Reason for Referral R TKR   Referred By Collins Puri   Patient Position Received Bed, 3 rail up   Preferred Learning Style verbal;visual   Precautions   LE Weight Bearing Status Weight Bearing as Tolerated   Pain Assessment   Pain Assessment 0-10   Pain Score 7   Pain Type Surgical pain   Pain Location Knee   Pain Orientation Right   Pain Interventions Cold applied   Cognition   Overall Cognitive Status WFL   Therapeutic Exercise   Therapeutic Exercise Performed Yes   Therapeutic Exercise Activity 1 AP,QS,GS,SLR,HIP ABD, MARCHING,BALL SQUEEZES X 20 B WITH EMPHASIS ON OPERATED LE   Therapeutic Exercise Activity 2 ROM   Bed Mobility   Bed Mobility Yes   Bed Mobility 1   Bed Mobility 1 Supine to sitting;Sitting to supine   Level of Assistance 1 Close supervision   Ambulation/Gait Training   Ambulation/Gait Training Performed Yes   Ambulation/Gait Training 1   Surface 1 Level tile   Device 1 Rolling walker   Gait Support Devices Gait belt   Assistance 1 Contact guard   Comments/Distance (ft) 1 150, 250  (Patient demos reciprocating steps with good heel strike toe off pattern)   Transfers   Transfer Yes   Transfer 1   Technique 1 Sit to stand;Stand to sit   Transfer Device 1 Walker   Transfer Level of Assistance 1 Close supervision    Trials/Comments 1 x3   Stairs   Stairs Yes   Stairs   Rails 1 Left   Device 1 Single point cane   Support Devices 1 Gait belt   Assistance 1 Contact guard   Comment/Number of Steps 1 3  (Patient dmeos good stability and safety awarenss on staiirs)   PT Assessment   PT Assessment Results Decreased strength;Decreased range of motion;Decreased mobility   Rehab Prognosis Excellent   End of Session Communication Bedside nurse   End of Session Patient Position Up in chair;Alarm on   PT Plan   Inpatient/Swing Bed or Outpatient Inpatient     Outcome Measures:  [facility] Basic Mobility  Turning from your back to your side while in a flat bed without using bedrails: A little  Moving from lying on your back to sitting on the side of a flat bed without using bedrails: A little  Moving to and from bed to chair (including a wheelchair): None  Standing up from a chair using your arms (e.g. wheelchair or bedside chair): None  To walk in hospital room: None  Climbing 3-5 steps with railing: A little  Basic Mobility - Total Score: 21                             EDUCATION:     Education Documentation  No documentation found.  Education Comments  No comments found.        GOALS:  Encounter Problems       Encounter Problems (Active)       Pain - Adult

## 2024-02-06 NOTE — CARE PLAN
The patient's goals for the shift include      The clinical goals for the shift include pt will demostrate comfort aeb sleeping in intervals fo 3 hours or greater by end of this shift    Over the shift, the patient did  make progress toward her goals, pt has experience pain in r knee, receiving oxy , tylenol, toradol , and dilaudid with fair results. Pt continue to have numbness and tingling in r leg but progressively improving as pain has increased, pt has own ice machine which is wearing . Pt has be able to demostrate exercise. I.s and reposition . Pt has had purewick on which has been effective. Pt has slept in long intervals and has remained safe has ncl within reach and bed alarm on

## 2024-02-06 NOTE — DISCHARGE SUMMARY
Discharge summary      This patient Manuela Pop was admitted to the hospital on 2/5/2024  after undergoing Procedure(s) (LRB):  Total Knee Arthroplasty (Right) without complications.    No significant or unexpected findings or abnormal ortho imaging were noted during the hospital stay    Hospital course      Patient tolerated surgical procedure well and there was no complications. Patient progressed adequately through their recovery during hospital stay including PT and rehabilitation.    Patient was then D/C on  to home  in stable condition.  Patient was instructed on the use of pain medications, the signs and symptoms of infection, and was given our number to call should they have any questions or concerns following discharge.    Based on my clinical judgment, the patient was provided with a 7-day prescription for opioid medication at 30 MED, indicated for treatment of acute pain in the setting of recent right TKA. OARRS report was run and has demonstrated an appropriate time course.  The patient has been provided with counseling pertaining to safe use of opioid medication.    Pertinent Physical Exam At Time of Discharge  Alert, oriented x 3, cooperative with examination.     Examination of the right lower  extremity reveals right knee  dressing is intact without drainage.  No lizeth-incisional ecchymosis.  EHL, plantarflexion, dorsiflexion are 4+/5.  Sensory intact light touch in the deep/superficial/common peroneal, saphenous, tibial, sural nerve distributions.  DP and popliteal pulses are 2+ with capillary refill less than 2 seconds.        Home Medications     Medication List      START taking these medications     aspirin 81 mg EC tablet; Take 1 tablet (81 mg) by mouth 2 times a day.   docusate sodium 100 mg capsule; Commonly known as: Colace; Take 1   capsule (100 mg) by mouth 2 times a day for 10 days.   ondansetron ODT 4 mg disintegrating tablet; Commonly known as:   Zofran-ODT; Take 1 tablet (4  mg) by mouth every 8 hours if needed for   nausea or vomiting for up to 5 days.   oxyCODONE 5 mg immediate release tablet; Commonly known as: Roxicodone;   Take 1 tablet (5 mg) by mouth every 6 hours if needed for moderate pain (4   - 6) or severe pain (7 - 10) for up to 7 days.     CONTINUE taking these medications     acetaminophen 500 mg tablet; Commonly known as: Tylenol   CALCIUM CARBONATE-VIT D3-MIN ORAL   cholecalciferol 25 MCG (1000 UT) tablet; Commonly known as: Vitamin D-3   famotidine 40 mg tablet; Commonly known as: Pepcid   magnesium (as gluconate) 27 mg magnesium (500 mg) tablet; Commonly known   as: Magonate   tretinoin 0.025 % cream; Commonly known as: Retin-A     STOP taking these medications     chlorhexidine 0.12 % solution; Commonly known as: Peridex   ibuprofen 200 mg tablet           Patient may bear weight as tolerated to operative extremity with use of walker for assistance with ambulation   Aquacel dressing to be removed pod7 and incision left open to air  Aspirin 81mg BID for DVT prophylaxis started on 2/6 and to be taken for 30  days  Follow up with surgeon in 2 weeks        Outpatient Follow-Up  Future Appointments   Date Time Provider Department Center   2/27/2024  1:00 PM Collins Ferrari MD [id number] [place]   7/9/2024 11:00 AM Shahbaz Alonzo MD [id number]             Iliana Robles PA-C

## 2024-02-06 NOTE — PROGRESS NOTES
02/06/24 1057   Discharge Planning   Living Arrangements Spouse/significant other   Support Systems Spouse/significant other   Type of Residence Private residence   Home or Post Acute Services In home services   Type of Home Care Services Home OT;Home PT   Patient expects to be discharged to: Home with [facility]   Does the patient need discharge transport arranged? No     Met with patient at bedside. Admitted for total right knee replacement. Pt lives with spouse and was independent PTA with no HHC. Pt has a walker. Was able to drive and obtain medications. Therapy evals pending. Pt plans to return home with [facility] PT/OT. Requested internal referral. Family will provide transport home.

## 2024-02-06 NOTE — PROGRESS NOTES
Manuela Pop is a 64 y.o. female presenting with Unilateral primary osteoarthritis, right knee.    Subjective   Ms. Pop is starting to have some right knee pain, now rated as moderate. She is voiding without issue. Looking forward to getting up and working with therapy. She is planning on going home upon discharge.        Objective     Physical Exam  Vitals reviewed.   HENT:      Head: Normocephalic.      Mouth/Throat:      Mouth: Mucous membranes are moist.      Pharynx: Oropharynx is clear.   Eyes:      Extraocular Movements: Extraocular movements intact.   Cardiovascular:      Rate and Rhythm: Normal rate.   Pulmonary:      Effort: Pulmonary effort is normal.   Abdominal:      Palpations: Abdomen is soft.   Musculoskeletal:      Comments: Right knee dressing is dry and intact.  light touch sensation is intact, ankle dorsiflexion/plantar flexion is intact. DP 2+/2 palpable     Skin:     General: Skin is warm and dry.      Capillary Refill: Capillary refill takes less than 2 seconds.   Neurological:      General: No focal deficit present.      Mental Status: She is alert. Mental status is at baseline.   Psychiatric:         Mood and Affect: Mood normal.         Last Recorded Vitals  Blood pressure 107/62, pulse 65, temperature 36.4 °C (97.5 °F), temperature source Temporal, resp. rate 17, height 1.524 m (5'), weight 62.5 kg (137 lb 12.6 oz), SpO2 94 %.  Intake/Output last 3 Shifts:  I/O last 3 completed shifts:  In: 4977.9 (79.6 mL/kg) [P.O.:480; I.V.:4197.9 (67.2 mL/kg); IV Piggyback:300]  Out: 2100 (33.6 mL/kg) [Urine:2050 (0.9 mL/kg/hr); Blood:50]  Weight: 62.5 kg     Relevant Results      Scheduled medications  acetaminophen, 650 mg, oral, q6h WALLACE  aspirin, 81 mg, oral, BID  docusate sodium, 100 mg, oral, BID  ketorolac, 30 mg, intravenous, q6h      Continuous medications  lactated Ringer's, 100 mL/hr, Last Rate: Stopped (02/05/24 1228)  lactated Ringer's, 50 mL/hr, Last Rate: 50 mL/hr  (02/05/24 1739)  oxygen, 2 L/min, Last Rate: Stopped (02/05/24 1445)      PRN medications  PRN medications: benzocaine-menthol, bisacodyl, cyclobenzaprine, diphenhydrAMINE, famotidine, HYDROmorphone, naloxone, naloxone, naloxone, naloxone, ondansetron ODT **OR** ondansetron, oxyCODONE, oxyCODONE, prochlorperazine **OR** prochlorperazine **OR** prochlorperazine  Results for orders placed or performed during the hospital encounter of 02/05/24 (from the past 24 hour(s))   CBC   Result Value Ref Range    WBC 10.5 4.4 - 11.3 x10*3/uL    nRBC 0.0 0.0 - 0.0 /100 WBCs    RBC 4.30 4.00 - 5.20 x10*6/uL    Hemoglobin 12.3 12.0 - 16.0 g/dL    Hematocrit 37.6 36.0 - 46.0 %    MCV 87 80 - 100 fL    MCH 28.6 26.0 - 34.0 pg    MCHC 32.7 32.0 - 36.0 g/dL    RDW 12.9 11.5 - 14.5 %    Platelets 254 150 - 450 x10*3/uL   Basic metabolic panel   Result Value Ref Range    Glucose 119 (H) 74 - 99 mg/dL    Sodium 137 136 - 145 mmol/L    Potassium 4.0 3.5 - 5.3 mmol/L    Chloride 106 98 - 107 mmol/L    Bicarbonate 24 21 - 32 mmol/L    Anion Gap 11 10 - 20 mmol/L    Urea Nitrogen 11 6 - 23 mg/dL    Creatinine 0.63 0.50 - 1.05 mg/dL    eGFR >90 >60 mL/min/1.73m*2    Calcium 8.6 8.6 - 10.3 mg/dL                    Assessment/Plan   Principal Problem:    Unilateral primary osteoarthritis, right knee  Active Problems:    S/P total knee arthroplasty, unspecified laterality    Total knee replacement status, right    POD #1 s/p right TKA  Continue PT/OT, WBAT right  LE  Reviewed am labs  Multimodal pain regimen  knee dressing to be removed on post op day #7  VTE prophylaxis: aspirin 81mg BID X 30days  Will discharge home when appropriate with [facility]  Follow up with Dr. Ferrari as directed         I spent 25 minutes in the professional and overall care of this patient.      Iliana Robles PA-C

## 2024-02-07 ENCOUNTER — TELEPHONE (OUTPATIENT)
Dept: ORTHOPEDIC SURGERY | Facility: CLINIC | Age: 64
End: 2024-02-07
Payer: COMMERCIAL

## 2024-02-07 ENCOUNTER — HOME CARE VISIT (OUTPATIENT)
Dept: HOME HEALTH SERVICES | Facility: HOME HEALTH | Age: 64
End: 2024-02-07
Payer: COMMERCIAL

## 2024-02-07 DIAGNOSIS — Z96.659 S/P TOTAL KNEE ARTHROPLASTY, UNSPECIFIED LATERALITY: ICD-10-CM

## 2024-02-07 PROCEDURE — 0023 HH SOC

## 2024-02-07 PROCEDURE — G0151 HHCP-SERV OF PT,EA 15 MIN: HCPCS

## 2024-02-07 RX ORDER — CYCLOBENZAPRINE HCL 10 MG
10 TABLET ORAL 3 TIMES DAILY PRN
Qty: 30 TABLET | Refills: 0 | Status: SHIPPED | OUTPATIENT
Start: 2024-02-07 | End: 2024-02-17

## 2024-02-07 ASSESSMENT — ENCOUNTER SYMPTOMS
PAIN LOCATION: RIGHT KNEE
PERSON REPORTING PAIN: PATIENT
PAIN SEVERITY GOAL: 2/10
PAIN LOCATION - PAIN SEVERITY: 4/10
HIGHEST PAIN SEVERITY IN PAST 24 HOURS: 10/10
LOWEST PAIN SEVERITY IN PAST 24 HOURS: 4/10
PAIN: 1
SUBJECTIVE PAIN PROGRESSION: WAXING AND WANING

## 2024-02-07 ASSESSMENT — ACTIVITIES OF DAILY LIVING (ADL)
AMBULATION_DISTANCE/DURATION_TOLERATED: 75 FEET
OASIS_M1830: 03
AMBULATION ASSISTANCE ON FLAT SURFACES: 1
ENTERING_EXITING_HOME: STAND BY ASSIST

## 2024-02-07 NOTE — TELEPHONE ENCOUNTER
Patient was called.  She will start taking a muscle relaxer and only one of the Oxycodone.  If this is not helping she will add Ibuprofen 800mg in the am with a meal and again 8 hrs later.  She will call if this does not help.

## 2024-02-07 NOTE — TELEPHONE ENCOUNTER
"Pt LVKARINA had TKA on 2/5 and is requesting a call back. States the \"pain is intense\" and the 5 mg of oxycodone she was sent home with is not helping, so she has started taking 2 pills at once. That helps a little more, but she is worried about running out of medication sooner. Please call her at [phone].  "

## 2024-02-07 NOTE — H&P
History Of Present Illness  Manuela Pop is a 64 y.o. female presenting with knee pain.     Past Medical History  She has a past medical history of Anesthesia of skin (07/19/2022), Encounter for screening for other viral diseases (07/30/2018), GERD (gastroesophageal reflux disease), Mastodynia (03/12/2021), Osteoarthritis, Other specified soft tissue disorders, and Personal history of other diseases of the musculoskeletal system and connective tissue (07/19/2022).    Surgical History  She has a past surgical history that includes Other surgical history (08/19/2015); Other surgical history (08/19/2015); Breast surgery (08/19/2015); Hernia repair (08/19/2015); Other surgical history (07/19/2022); Other surgical history (07/19/2022); Other surgical history (07/19/2022); and Hand surgery.     Social History  She reports that she quit smoking about 16 years ago. Her smoking use included cigarettes. She has never used smokeless tobacco. She reports current alcohol use. She reports that she does not use drugs.    Family History  Family History   Problem Relation Name Age of Onset    Lung cancer Father      Pancreatic cancer Father's Sister      Cancer Other Multiple Family Members     Breast cancer Other Cousin         Allergies  Patient has no known allergies.    Review of Systems   CONSTITUTIONAL: Denies weight loss, fever and chills.    - HEENT: Denies changes in vision and hearing.    - RESPIRATORY: Denies SOB and cough.    - CV: Denies palpitations and CP.    - GI: Denies abdominal pain, nausea, vomiting and diarrhea.    - : Denies dysuria and urinary frequency.    - MSK: See physical exam findings     - SKIN: Denies rash and pruritus.    - NEUROLOGICAL: Denies headache and syncope.    - PSYCHIATRIC: Denies recent changes in mood. Denies anxiety and depression.      Physical Exam  - GENERAL: Alert and oriented x 3. No acute distress. Well-nourished.    - EYES: EOMI. Anicteric.    - HENT: Moist mucous  membranes. No scleral icterus. No cervical lymphadenopathy.    - LUNGS: Clear to auscultation bilaterally. No accessory muscle use.    - CARDIOVASCULAR: Regular rate and rhythm. No murmur. No JVD.    - ABDOMEN: Soft, non-tender and non-distended. No palpable masses.    - EXTREMITIES: knee pain     - NEUROLOGIC: No focal neurological deficits. CN II-XII grossly intact, but not individually tested.    - PSYCHIATRIC: Cooperative. Appropriate mood and affect.      Last Recorded Vitals  Blood pressure 124/70, pulse 71, temperature 36.6 °C (97.9 °F), temperature source Temporal, resp. rate 17, height 1.524 m (5'), weight 62.5 kg (137 lb 12.6 oz), SpO2 95 %.    Relevant Results      Scheduled medications    Continuous medications    PRN medications    No results found for this or any previous visit (from the past 24 hour(s)).    Assessment/Plan   Principal Problem:    Unilateral primary osteoarthritis, right knee  Active Problems:    S/P total knee arthroplasty, unspecified laterality    Total knee replacement status, right      TKA agreeable       I spent 10 minutes in the professional and overall care of this patient.      Raoul Sheffield PA-C

## 2024-02-08 NOTE — HOME HEALTH
Patient is a 64 jose old female referred for skilled PT home care after recent hospitalization due to R TKR. Patient lives with  in one story home with two steps to enter. Prior level of function patient able to ambulate independently in home an dcommunity and able to drive for work.     Patient ambulates slowly in home using FWW demonstrating antalgic gait with decreased heel toe gait pattern. Completes bed mobility, transfers and ambulation with SBA. Surigcal incision intact wtih no signs of infection. Educated patient and caregiver on signs and symptoms of infection and DVT and to contact doctor with any concerns. Patient reports high pain leveles throughout session. Educated on use of pain medication as prescritbed and ice to help reduce post operative pain. patient would benefit from skilled Pt home care to improve LE strength, ROM, transfers, ambulation, bed mobility and progress HEP to return to prior level of function.

## 2024-02-09 ENCOUNTER — HOME CARE VISIT (OUTPATIENT)
Dept: HOME HEALTH SERVICES | Facility: HOME HEALTH | Age: 64
End: 2024-02-09
Payer: COMMERCIAL

## 2024-02-09 PROCEDURE — G0152 HHCP-SERV OF OT,EA 15 MIN: HCPCS

## 2024-02-09 PROCEDURE — G0157 HHC PT ASSISTANT EA 15: HCPCS

## 2024-02-09 ASSESSMENT — ENCOUNTER SYMPTOMS
PERSON REPORTING PAIN: PATIENT
PAIN LOCATION: RIGHT KNEE
HIGHEST PAIN SEVERITY IN PAST 24 HOURS: 7/10
LOWEST PAIN SEVERITY IN PAST 24 HOURS: 4/10
PAIN: 1

## 2024-02-09 ASSESSMENT — ACTIVITIES OF DAILY LIVING (ADL)
PHYSICAL TRANSFERS ASSESSED: 1
DRESSING_LB_CURRENT_FUNCTION: INDEPENDENT
TOILETING: INDEPENDENT
FEEDING ASSESSED: 1
BATHING_CURRENT_FUNCTION: SUPERVISION
GROOMING ASSESSED: 1
TOILETING: 1
LAUNDRY: NEEDS ASSISTANCE
CURRENT_FUNCTION: INDEPENDENT
GROOMING_CURRENT_FUNCTION: INDEPENDENT
DRESSING_UB_CURRENT_FUNCTION: INDEPENDENT
BATHING ASSESSED: 1
FEEDING: INDEPENDENT
PREPARING MEALS: NEEDS ASSISTANCE
LAUNDRY ASSESSED: 1

## 2024-02-09 NOTE — HOME HEALTH
Patient seen with spouse for OT evaluation visit. Patient had a right TKR on 2.5.24. Lives with spouse in a ranch home with 3 steps to enter the front door. Has a full bathroom with a walk-in shower, everything on the first floor.     Medical history of OA and GERD.     Patient has a wheeled walker with a walker pouch, BSC (uses in the shower), handheld shower, lh sponge. Patient will be getting a cane.    Prior to hospitalization, was independent with ADLs and IADLs, she drove. She works FT in a middle school.     Barthel index-95 out of 100.     UE AROM and strength WNL. Patient in agreement with OT POC and no further OT visits indicated. OT DC at this date, goals met. PT services are still active.

## 2024-02-10 VITALS
DIASTOLIC BLOOD PRESSURE: 78 MMHG | SYSTOLIC BLOOD PRESSURE: 122 MMHG | OXYGEN SATURATION: 98 % | RESPIRATION RATE: 18 BRPM | TEMPERATURE: 97.6 F | HEART RATE: 82 BPM

## 2024-02-10 ASSESSMENT — ENCOUNTER SYMPTOMS
PAIN LOCATION: RIGHT KNEE
PAIN SEVERITY GOAL: 0/10
SUBJECTIVE PAIN PROGRESSION: GRADUALLY IMPROVING
PAIN: 1
PAIN LOCATION - PAIN QUALITY: THROBBING
PAIN LOCATION - PAIN SEVERITY: 4/10
PAIN LOCATION - RELIEVING FACTORS: ICE, OTC MEDS
HIGHEST PAIN SEVERITY IN PAST 24 HOURS: 6/10
PAIN LOCATION - PAIN FREQUENCY: FREQUENT
PERSON REPORTING PAIN: PATIENT
LOWEST PAIN SEVERITY IN PAST 24 HOURS: 2/10

## 2024-02-12 ENCOUNTER — HOME CARE VISIT (OUTPATIENT)
Dept: HOME HEALTH SERVICES | Facility: HOME HEALTH | Age: 64
End: 2024-02-12
Payer: COMMERCIAL

## 2024-02-12 ENCOUNTER — TELEPHONE (OUTPATIENT)
Dept: ORTHOPEDIC SURGERY | Facility: CLINIC | Age: 64
End: 2024-02-12
Payer: COMMERCIAL

## 2024-02-12 PROCEDURE — G0151 HHCP-SERV OF PT,EA 15 MIN: HCPCS

## 2024-02-12 SDOH — HEALTH STABILITY: PHYSICAL HEALTH: EXERCISE TYPE: SUPINE

## 2024-02-12 SDOH — HEALTH STABILITY: PHYSICAL HEALTH: EXERCISE COMMENTS: ANKLE PUMPS; GLUT SET; QUAD SET; SAQ; HEEL SLIDES

## 2024-02-12 ASSESSMENT — ENCOUNTER SYMPTOMS
HIGHEST PAIN SEVERITY IN PAST 24 HOURS: 5/10
PAIN: 1
PAIN LOCATION - PAIN SEVERITY: 1/10
PERSON REPORTING PAIN: PATIENT
PAIN SEVERITY GOAL: 1/10
LOWEST PAIN SEVERITY IN PAST 24 HOURS: 1/10
PAIN LOCATION - PAIN QUALITY: ACHY
PAIN LOCATION: RIGHT KNEE

## 2024-02-12 ASSESSMENT — ACTIVITIES OF DAILY LIVING (ADL)
AMBULATION_DISTANCE/DURATION_TOLERATED: 75
AMBULATION ASSISTANCE ON FLAT SURFACES: 1

## 2024-02-12 NOTE — TELEPHONE ENCOUNTER
Pt called was told last week she could take ibuprofen in between oxycodone. She did that for a couple days but stopped oxycodone mid last week and only has been taking about 800 mg of ibuprofen throughout the day. She only has some pain mostly at night. Is asking if okay to continue the ibuprofen or switch to Tylenol instead or should she just take the muscle relaxer?

## 2024-02-13 ENCOUNTER — APPOINTMENT (OUTPATIENT)
Dept: ORTHOPEDIC SURGERY | Facility: CLINIC | Age: 64
End: 2024-02-13
Payer: COMMERCIAL

## 2024-02-13 PROCEDURE — G0180 MD CERTIFICATION HHA PATIENT: HCPCS | Performed by: ORTHOPAEDIC SURGERY

## 2024-02-13 NOTE — HOME HEALTH
Removed surgical incision per DC instructions and left open to air. Incision intact with no signs of infection. Educated patient and caregiver on signs and symptoms of infection and DVT and to contact doctor with any concerns. Gait training with rolling walker with fair heel toe gait pattern. Knee flexion ROM slowly improving, educated patient to push ROM in seated and supine position. Progressed standing LE strengthening which patient tolerated well. patient would benefit from skilled Pt home care to improve LE strength, ROM, transfers, ambulation, bed mobility and progress HEP to return to prior level of function.

## 2024-02-15 ENCOUNTER — HOME CARE VISIT (OUTPATIENT)
Dept: HOME HEALTH SERVICES | Facility: HOME HEALTH | Age: 64
End: 2024-02-15
Payer: COMMERCIAL

## 2024-02-15 PROCEDURE — G0151 HHCP-SERV OF PT,EA 15 MIN: HCPCS

## 2024-02-15 SDOH — HEALTH STABILITY: PHYSICAL HEALTH: EXERCISE TYPE: SUPINE

## 2024-02-15 SDOH — HEALTH STABILITY: PHYSICAL HEALTH
EXERCISE COMMENTS: ANKLE PUMPS; GLUT SET; QUAD SET; SAQ; HEEL SLIDES    SEATED HEEL SLIDES    STANDING LE STRENGTHENIGN 1X A DAY, RLE ONLY; HEEL RAISES, MARCHING, HIP ABDUCTION, HIP EXTENSION, HAMSTRING CURL

## 2024-02-15 ASSESSMENT — ACTIVITIES OF DAILY LIVING (ADL)
AMBULATION_DISTANCE/DURATION_TOLERATED: 150 FEET
AMBULATION ASSISTANCE ON FLAT SURFACES: 1

## 2024-02-15 ASSESSMENT — ENCOUNTER SYMPTOMS
PAIN LOCATION: RIGHT KNEE
PAIN SEVERITY GOAL: 2/10
PAIN LOCATION - PAIN SEVERITY: 4/10
PAIN: 1
LOWEST PAIN SEVERITY IN PAST 24 HOURS: 4/10
HIGHEST PAIN SEVERITY IN PAST 24 HOURS: 10/10
SUBJECTIVE PAIN PROGRESSION: WAXING AND WANING
PERSON REPORTING PAIN: PATIENT

## 2024-02-16 DIAGNOSIS — M25.561 ARTHRALGIA OF RIGHT KNEE: ICD-10-CM

## 2024-02-16 DIAGNOSIS — Z96.659 S/P TOTAL KNEE ARTHROPLASTY, UNSPECIFIED LATERALITY: ICD-10-CM

## 2024-02-16 NOTE — HOME HEALTH
Patient reports pain levels continue to be high. Has not been taking oxycodone because she does not like the way it makes her feel. Educated patient to contact surgeon with any questions or concerns regarding medication but to use pain medication as prescribed and continue to use ice. Knee flexion ROM slowly improving with patient able to achieve 84 degrees in seated position. Educated patient to push ROM exercises 3x a day with verbal and tactile cueing for proprer positioning.  Also educated patient on heel toe gait pattern adn to avoid circumduction during swing phase. patient would benefit from skilled Pt home care to improve LE strength, ROM, transfers, ambulation, bed mobility and progress HEP to return to prior level of function.

## 2024-02-19 ENCOUNTER — HOME CARE VISIT (OUTPATIENT)
Dept: HOME HEALTH SERVICES | Facility: HOME HEALTH | Age: 64
End: 2024-02-19
Payer: COMMERCIAL

## 2024-02-19 PROCEDURE — G0151 HHCP-SERV OF PT,EA 15 MIN: HCPCS

## 2024-02-19 SDOH — HEALTH STABILITY: PHYSICAL HEALTH
EXERCISE COMMENTS: ANKLE PUMPS; GLUT SET; QUAD SET; SAQ; HEEL SLIDES  HEEL RAISE, MARCHING, HIP ABDUCTION, HIP EXTENSION, HAMSTRING CURL, GASTROC STRETCH

## 2024-02-19 SDOH — HEALTH STABILITY: PHYSICAL HEALTH: EXERCISE TYPE: SUPINE AND STANDING

## 2024-02-19 ASSESSMENT — ENCOUNTER SYMPTOMS
PERSON REPORTING PAIN: PATIENT
HIGHEST PAIN SEVERITY IN PAST 24 HOURS: 8/10
PAIN: 1
PAIN LOCATION: RIGHT KNEE
PAIN LOCATION - PAIN SEVERITY: 3/10
PAIN SEVERITY GOAL: 1/10
SUBJECTIVE PAIN PROGRESSION: WAXING AND WANING
LOWEST PAIN SEVERITY IN PAST 24 HOURS: 3/10

## 2024-02-19 ASSESSMENT — ACTIVITIES OF DAILY LIVING (ADL)
AMBULATION_DISTANCE/DURATION_TOLERATED: 100 FEET
AMBULATION ASSISTANCE ON FLAT SURFACES: 1

## 2024-02-20 ENCOUNTER — TELEPHONE (OUTPATIENT)
Dept: ORTHOPEDIC SURGERY | Facility: CLINIC | Age: 64
End: 2024-02-20
Payer: COMMERCIAL

## 2024-02-20 DIAGNOSIS — Z96.659 S/P TOTAL KNEE ARTHROPLASTY, UNSPECIFIED LATERALITY: ICD-10-CM

## 2024-02-20 NOTE — TELEPHONE ENCOUNTER
Pt called had recent TKA and is only now taking Tylenol. The pain during the day is not bad, almost non-existent, but at night the top of her knee starts burning to the point she cannot sleep. Has not had more than about 4 hours of straight sleep since her surgery. Is still on the blood thinner so she is unable to take any ibuprofen which was the only thing that seemed to help her when she did take it once. Asking if there is anything else she can do or be prescribed to help her stay asleep. Has tried Z-Quil but that doesn't help either. She does have a muscle relaxer we prescribed for her but she hasn't taken that in a couple of weeks and when she did it did not help much either.

## 2024-02-20 NOTE — HOME HEALTH
Patient continues with high pain levels and stiffness into R LE. Surgical incision healing well with no signs of infection. Reviewed HEp and progressed standign gastroc stretch for ROM. Knee flexion ROM slowly improving but patient continues to be tight an dlimited with ROM. Continued to educate to progress ROM as tolerated. Trialed ambulation using [facility] verbal cueing for proper sequencing and for heel toe gait pattern. patient would benefit from skilled Pt home care to improve LE strength, ROM, transfers, ambulation, bed mobility and progress HEP to return to prior level of function.

## 2024-02-20 NOTE — TELEPHONE ENCOUNTER
Spoke to patient notified her that per dr mcwilliams we can send in valium to try to help her sleep. Pt agreed.

## 2024-02-21 RX ORDER — DIAZEPAM 5 MG/1
TABLET ORAL
Qty: 2 TABLET | Refills: 0 | Status: SHIPPED | OUTPATIENT
Start: 2024-02-21

## 2024-02-22 ENCOUNTER — HOME CARE VISIT (OUTPATIENT)
Dept: HOME HEALTH SERVICES | Facility: HOME HEALTH | Age: 64
End: 2024-02-22
Payer: COMMERCIAL

## 2024-02-22 PROCEDURE — G0151 HHCP-SERV OF PT,EA 15 MIN: HCPCS

## 2024-02-22 ASSESSMENT — ENCOUNTER SYMPTOMS
PAIN LOCATION: RIGHT KNEE
LOWEST PAIN SEVERITY IN PAST 24 HOURS: 1/10
PAIN: 1
HIGHEST PAIN SEVERITY IN PAST 24 HOURS: 9/10
PERSON REPORTING PAIN: PATIENT
PAIN SEVERITY GOAL: 0/10
PAIN LOCATION - PAIN SEVERITY: 2/10
SUBJECTIVE PAIN PROGRESSION: WAXING AND WANING

## 2024-02-22 ASSESSMENT — ACTIVITIES OF DAILY LIVING (ADL)
HOME_HEALTH_OASIS: 00
AMBULATION ASSISTANCE ON FLAT SURFACES: 1
OASIS_M1830: 00
AMBULATION_DISTANCE/DURATION_TOLERATED: 150 FEET

## 2024-02-23 NOTE — HOME HEALTH
Patient ambulates in home using SPC demonstrating modified independence.   Completes ambluation , bed mobility and transfers with modified independence. Surgical incision healing well with no signs of infectio. Patient continues to be concerned by pain levels. Conitnued to review pain medication as prescribed and use of ice for pain and inflammation. Reviewed HEpand patient able to complete with good return. Knee ROM -3 to 91 degrees on this date. At this time patient has met goals for skilled PT home care and will be discharged to HEP and oupatient therapy.

## 2024-02-27 ENCOUNTER — OFFICE VISIT (OUTPATIENT)
Dept: ORTHOPEDIC SURGERY | Facility: CLINIC | Age: 64
End: 2024-02-27
Payer: COMMERCIAL

## 2024-02-27 ENCOUNTER — HOSPITAL ENCOUNTER (OUTPATIENT)
Dept: RADIOLOGY | Facility: CLINIC | Age: 64
Discharge: HOME | End: 2024-02-27
Payer: COMMERCIAL

## 2024-02-27 DIAGNOSIS — G89.18 POST-OP PAIN: Primary | ICD-10-CM

## 2024-02-27 DIAGNOSIS — Z96.659 S/P TOTAL KNEE ARTHROPLASTY, UNSPECIFIED LATERALITY: ICD-10-CM

## 2024-02-27 PROCEDURE — 1036F TOBACCO NON-USER: CPT | Performed by: ORTHOPAEDIC SURGERY

## 2024-02-27 PROCEDURE — 73562 X-RAY EXAM OF KNEE 3: CPT | Mod: RT

## 2024-02-27 PROCEDURE — 99024 POSTOP FOLLOW-UP VISIT: CPT | Performed by: ORTHOPAEDIC SURGERY

## 2024-02-27 PROCEDURE — 73562 X-RAY EXAM OF KNEE 3: CPT | Mod: RIGHT SIDE | Performed by: RADIOLOGY

## 2024-02-27 RX ORDER — TRAMADOL HYDROCHLORIDE 50 MG/1
50 TABLET ORAL EVERY 8 HOURS PRN
Qty: 14 TABLET | Refills: 0 | Status: SHIPPED | OUTPATIENT
Start: 2024-02-27 | End: 2024-03-05

## 2024-02-27 NOTE — PROGRESS NOTES
History of Present Illness:  Patient returns today endorsing moderate pain.  Denies any numbness or tingling.  No calf pain, No chest pain or shortness of breath.    Physical Examination:  Mild swelling  Healthy incision, no erythema or drainage  ROM:  0-95  + Plantar/dorsiflexion  Negative Osorio test    Imaging:  Appropriate position and alignment no evidence of implant failure     Assessment  Patient status post right total knee arthroplasty, doing well    Plan:  Discussed analgesics, encouraging non-opioid modalities.  Encouraged ice, rest.  Discussed importance of ROM/ formal physical therapy.  Reviewed dental prophylaxis.  Follow-up in 1 month for a motion check.    I have personally reviewed the OARRS report for this patient. This report is scanned into  the electronic medical record. I have considered the risks of abuse, dependence, addiction, and diversion. They currently report a pain of 8.     Raoul Sheffield PA-C     In a face to face encounter, I evaluated the patient and performed a physical examination, discussed pertinent diagnostic studies if indicated and discussed diagnosis and management strategies with both the patient and physician assistant / nurse practitioner.  I reviewed the PA/NP's note and agree with the documented findings and plan of care.        Collins Ferrari MD

## 2024-03-11 ENCOUNTER — TELEPHONE (OUTPATIENT)
Dept: ORTHOPEDIC SURGERY | Facility: CLINIC | Age: 64
End: 2024-03-11
Payer: COMMERCIAL

## 2024-03-11 NOTE — TELEPHONE ENCOUNTER
Pt DONNYM went to therapy today and she has a rash on her incision. It is warm and has little bumps along the side of it. Therapist told her to call the office. She is not sure if needs to be seen or what to do. Please call her at [phone].

## 2024-03-12 ENCOUNTER — OFFICE VISIT (OUTPATIENT)
Dept: ORTHOPEDIC SURGERY | Facility: CLINIC | Age: 64
End: 2024-03-12
Payer: COMMERCIAL

## 2024-03-12 DIAGNOSIS — G89.18 POST-OP PAIN: Primary | ICD-10-CM

## 2024-03-12 DIAGNOSIS — L03.90 CELLULITIS OF SKIN: ICD-10-CM

## 2024-03-12 DIAGNOSIS — L23.0 ALLERGIC CONTACT DERMATITIS DUE TO METALS: ICD-10-CM

## 2024-03-12 PROCEDURE — 99024 POSTOP FOLLOW-UP VISIT: CPT | Performed by: STUDENT IN AN ORGANIZED HEALTH CARE EDUCATION/TRAINING PROGRAM

## 2024-03-12 PROCEDURE — 1036F TOBACCO NON-USER: CPT | Performed by: STUDENT IN AN ORGANIZED HEALTH CARE EDUCATION/TRAINING PROGRAM

## 2024-03-12 RX ORDER — PREDNISONE 50 MG/1
50 TABLET ORAL DAILY
Qty: 5 TABLET | Refills: 0 | Status: SHIPPED | OUTPATIENT
Start: 2024-03-12 | End: 2024-03-17

## 2024-03-12 RX ORDER — CEPHALEXIN 500 MG/1
500 CAPSULE ORAL 2 TIMES DAILY
Qty: 14 CAPSULE | Refills: 0 | Status: SHIPPED | OUTPATIENT
Start: 2024-03-12 | End: 2024-03-19

## 2024-03-12 NOTE — PROGRESS NOTES
History of Present Illness:  Patient returns today endorsing mild pain.  Patient notes improving functional status.  She does endorse a small rash that is developed with the past several days.  She was putting some silver aloe vera lotion onto the incision over the past several days, she also had a dressing with silver in it.  Patient has a history of some metal allergies although was not specifically tested for silver.    Physical Examination:  Well-healing incision, well-approximated, there is surrounding erythema about 1-1/2 to 2 cm circumferentially feeding into a papular lesion/rash about a 10 cm circumference around the incision going into the distal thigh and proximal leg.  ROM: 0-1 18  No laxity to varus / valgus stress  + Plantar/dorsiflexion  Negative Osorio test    Assessment:  Patient status post right total knee arthroplasty, doing well, small localized topical irritant reaction versus underlying skin infection.    Plan:  Discussed analgesics.  Review the prednisone prescription, as well as Keflex.  Reviewed range of motion, strength goals.  Discussed activities to avoid  Dental prophylaxis discussed.  Follow-up:  1 week, as scheduled    Raoul Sheffield PA-C

## 2024-03-19 ENCOUNTER — OFFICE VISIT (OUTPATIENT)
Dept: ORTHOPEDIC SURGERY | Facility: CLINIC | Age: 64
End: 2024-03-19
Payer: COMMERCIAL

## 2024-03-19 DIAGNOSIS — Z96.659 S/P TOTAL KNEE ARTHROPLASTY, UNSPECIFIED LATERALITY: Primary | ICD-10-CM

## 2024-03-19 PROCEDURE — 1036F TOBACCO NON-USER: CPT | Performed by: ORTHOPAEDIC SURGERY

## 2024-03-19 PROCEDURE — 99024 POSTOP FOLLOW-UP VISIT: CPT | Performed by: ORTHOPAEDIC SURGERY

## 2024-03-19 NOTE — PROGRESS NOTES
History of Present Illness:  Patient returns today endorsing mild pain.  Patient notes improving functional status.  Still has some mild irritation of the incision that she has been attributing to a contact dermatitis.  Took her full dose of antibiotics, this began to give her a chest skin rash that is now resolving.    Physical Examination:  Well healed incision, mild erythema surrounding the incision likely contact irritant/dermatitis based off presentation  ROM: 0-120  No laxity to varus / valgus stress  + Plantar/dorsiflexion  Negative Osorio test    Assessment:  Patient status post right total knee arthroplasty, doing well    Plan:  Discussed analgesics.  Discussed that she is able to return to work and perform her duties of her job as they do not require significant stress upon the implant.  Reviewed range of motion, strength goals.  Discussed activities to avoid  Dental prophylaxis discussed.  Follow-up:  2 months    Raoul Sheffield PA-C    In a face to face encounter, I evaluated the patient and performed a physical examination, discussed pertinent diagnostic studies if indicated and discussed diagnosis and management strategies with both the patient and physician assistant / nurse practitioner.  I reviewed the PA/NP's note and agree with the documented findings and plan of care.        Collins Ferrari MD

## 2024-03-19 NOTE — LETTER
March 19, 2024     Patient: Manuela Pop   YOB: 1960   Date of Visit: 3/19/2024       To Whom it May Concern:    Manuela Pop was seen in my clinic on 3/19/2024. She  my return to work on 03/25/2024 with no restrictions .    If you have any questions or concerns, please don't hesitate to call.         Sincerely,          Collins Ferrari MD        CC: No Recipients

## 2024-03-19 NOTE — LETTER
March 19, 2024     Patient: Manuela Pop   YOB: 1960   Date of Visit: 3/19/2024       To Whom It May Concern:    It is my medical opinion that Manuela Pop may return to work on 03/25/2024 .    If you have any questions or concerns, please don't hesitate to call.         Sincerely,        Collins Ferrari MD    CC: No Recipients

## 2024-04-18 ENCOUNTER — TELEPHONE (OUTPATIENT)
Dept: ORTHOPEDIC SURGERY | Facility: CLINIC | Age: 64
End: 2024-04-18
Payer: COMMERCIAL

## 2024-04-18 DIAGNOSIS — M17.11 UNILATERAL PRIMARY OSTEOARTHRITIS, RIGHT KNEE: ICD-10-CM

## 2024-04-18 DIAGNOSIS — Z96.659 S/P TOTAL KNEE ARTHROPLASTY, UNSPECIFIED LATERALITY: ICD-10-CM

## 2024-04-18 NOTE — TELEPHONE ENCOUNTER
Pt LVM has been in a lot of pain lately from back of thigh to her knee and down to the ankle. She is asking if this is normal and would like to speak with Dr. Vega Silveira or an MA. She is not available today until after 4pm. [phone].

## 2024-04-19 RX ORDER — METHYLPREDNISOLONE 4 MG/1
TABLET ORAL
Qty: 21 TABLET | Refills: 0 | Status: SHIPPED | OUTPATIENT
Start: 2024-04-19

## 2024-04-19 NOTE — TELEPHONE ENCOUNTER
Spoke to patient she would like to try a medrol dose pack to hope this helps. Pt understood and will call back if no improvement.

## 2024-05-21 ENCOUNTER — OFFICE VISIT (OUTPATIENT)
Dept: ORTHOPEDIC SURGERY | Facility: CLINIC | Age: 64
End: 2024-05-21
Payer: COMMERCIAL

## 2024-05-21 DIAGNOSIS — Z96.659 S/P TOTAL KNEE ARTHROPLASTY, UNSPECIFIED LATERALITY: ICD-10-CM

## 2024-05-21 DIAGNOSIS — Z96.659 S/P TOTAL KNEE ARTHROPLASTY, UNSPECIFIED LATERALITY: Primary | ICD-10-CM

## 2024-05-21 PROCEDURE — 99212 OFFICE O/P EST SF 10 MIN: CPT | Performed by: ORTHOPAEDIC SURGERY

## 2024-05-21 PROCEDURE — 99213 OFFICE O/P EST LOW 20 MIN: CPT | Performed by: ORTHOPAEDIC SURGERY

## 2024-05-21 PROCEDURE — 1036F TOBACCO NON-USER: CPT | Performed by: ORTHOPAEDIC SURGERY

## 2024-05-21 RX ORDER — AMOXICILLIN 500 MG/1
CAPSULE ORAL
Qty: 4 CAPSULE | Refills: 5 | Status: SHIPPED | OUTPATIENT
Start: 2024-05-21

## 2024-05-21 NOTE — PROGRESS NOTES
History of Present Illness:  Patient returns today endorsing minimal pain.  Patient notes improving functional status.    Physical Examination:  Well healed incision   ROM: 0-125  No laxity to varus / valgus stress  + Plantar/dorsiflexion  Negative Osorio test    Assessment:  Patient status post right total knee arthroplasty, doing well    Plan:  Discussed improvement in strength, swelling over the course of the first year post op.  Discussed return to activities and activities to avoid.  Dental prophylaxis discussed. Antibiotics prescribed.   Follow-up:  Not needed at this time     Raoul Sheffield PA-C     In a face to face encounter, I evaluated the patient and performed a physical examination, discussed pertinent diagnostic studies if indicated and discussed diagnosis and management strategies with both the patient and physician assistant / nurse practitioner.  I reviewed the PA/NP's note and agree with the documented findings and plan of care.        Collins Ferrari MD    Addendum:   We will submit for authorization of viscosupplementation injection for left knee.     Patient has a diagnosis of knee osteoarthritis supported by radiological evidence.  There is partially preserved joint space in the patellofemoral, medial and lateral compartments of the knee on weight-bearing AP x-rays within the past two years.  Pain interferes with functional activities.  Patient has failed to respond to 6 months of treatments including activity modification, home exercise, protective weightbearing and physical therapy.  Patient has failed to respond adequately to at least 6 months trials two analgesics.  Patient has failed to respond to an adequate trial of intra-articular steroid injection with less than six weeks of pain improvement.

## 2024-07-09 ENCOUNTER — APPOINTMENT (OUTPATIENT)
Dept: OBSTETRICS AND GYNECOLOGY | Facility: CLINIC | Age: 64
End: 2024-07-09
Payer: COMMERCIAL

## 2024-07-09 VITALS
HEIGHT: 60 IN | WEIGHT: 126 LBS | SYSTOLIC BLOOD PRESSURE: 100 MMHG | DIASTOLIC BLOOD PRESSURE: 70 MMHG | BODY MASS INDEX: 24.74 KG/M2

## 2024-07-09 DIAGNOSIS — Z01.419 WELL WOMAN EXAM WITH ROUTINE GYNECOLOGICAL EXAM: ICD-10-CM

## 2024-07-09 DIAGNOSIS — Z12.31 BREAST CANCER SCREENING BY MAMMOGRAM: ICD-10-CM

## 2024-07-09 PROCEDURE — 87624 HPV HI-RISK TYP POOLED RSLT: CPT

## 2024-07-09 PROCEDURE — 99396 PREV VISIT EST AGE 40-64: CPT | Performed by: OBSTETRICS & GYNECOLOGY

## 2024-07-09 PROCEDURE — 88175 CYTOPATH C/V AUTO FLUID REDO: CPT

## 2024-07-09 PROCEDURE — 1036F TOBACCO NON-USER: CPT | Performed by: OBSTETRICS & GYNECOLOGY

## 2024-07-09 RX ORDER — CLOBETASOL PROPIONATE 0.5 MG/G
CREAM TOPICAL
COMMUNITY
Start: 2024-03-28

## 2024-07-09 ASSESSMENT — PAIN SCALES - GENERAL: PAINLEVEL: 0-NO PAIN

## 2024-07-09 NOTE — PROGRESS NOTES
Chief Complaint   Patient presents with    Well Women Visit     Annual exam with pap smear and mammogram order    No complaints    Chaperone declined     For annual care    Recently had abdominoplasty.  Happy with results.  Also, this past year had right knee replaced.  Again, happy with how things have turned out.    Patient was seen in December 2023 for intermittent nonpersistent right breast pain.  At that point wanted to expectantly manage but we did place an order for a right diagnostic mammogram in case sx's became persistent.  Patient has no further breast pain and feels her self breast exam is normal.  Therefore, today we will order a screening mammogram.  Discussed with patient that, when she calls to schedule to make certain they are scheduling a screening mammogram, not diagnostic.    REVIEW OF SYSTEMS    Please see HPI for reported pertinent positives, which would supersede this ROS    Constitutional:  Denies fever, chills, wt gain or loss, fatigue    Genito-Urinary:  Denies genital lesion or sores, vaginal dryness, itching  or pain.  No abnormal vaginal discharge or unexplained vaginal bleeding.  No dysuria, urinary incontinence or frequency.  Denies pelvic pain, dysmenorrhea or dyspareunia.    Eyes:  Denies vision changes, dryness  ENT:  No hearing loss, sinus pain or congestion, nosebleeds  Cardiovascular:  No chest pain or palpitations  Respiratory:  No SOB, cough, wheezing  GI:  No Nausea, vomiting, diarrhea, constipation, abdominal pain  Musculoskeletal:  No new back pain. joint pain, peripheral edema  Skin:  No rash or skin lesion  Neurologic:  No HA, numbness or dizziness  Psychiatric:  No new anxiety or depression  Endocrine:  No loss of hair or hirsutism  Hematologic/lymphatic:  No swollen lymph nodes.  No reported tendency for easy bruising or bleeding    Patient admits to no other systemic complaints      Vitals:    07/09/24 1102   BP: 100/70       PHYSICAL EXAM:    PSYCH:  Pt is alert,  oriented and cooperative    SKIN: warm, dry, w/o lesion    HEAD AND FACE:  External inspection of eyes, ears, functional cranial nerves normal and intact    THYROID:  No thyromegaly    CARDIOVASCULAR:  Warm and well Perfused    PULMONARY:  No respiratory distress    ABDOMEN:  soft, nontender.  No mass or organomegaly.      BREAST:  Breasts are symmetric to inspection and palpation.  No mass palpable bilaterally.  There is no axillary lymphadenopathy    PELVIC:    External genitalia, urethra, perianal region normal to inspection and palpation if indicated.  No inguinal LA    Vagina without lesions.    Cervix seen and visually normal      Bimanual exam:      No pelvic mass palpable    Uterus nontender, midline in pelvis    No adnexal masses or tenderness    Rectal - no mass    Assessment/Plan    Diagnoses and all orders for this visit:  Well woman exam with routine gynecological exam  -     THINPREP PAP TEST  Breast cancer screening by mammogram  -     BI mammo bilateral screening tomosynthesis; Future

## 2024-08-14 ENCOUNTER — TELEPHONE (OUTPATIENT)
Dept: OBSTETRICS AND GYNECOLOGY | Facility: CLINIC | Age: 64
End: 2024-08-14
Payer: COMMERCIAL

## 2024-08-14 NOTE — TELEPHONE ENCOUNTER
Patient called stating she is in menopause and had a brownish colored discharge. She has an appointment scheduled for Tuesday but would like to speak with you.

## 2024-08-16 DIAGNOSIS — N95.0 PMB (POSTMENOPAUSAL BLEEDING): ICD-10-CM

## 2024-08-20 ENCOUNTER — APPOINTMENT (OUTPATIENT)
Dept: RADIOLOGY | Facility: CLINIC | Age: 64
End: 2024-08-20
Payer: COMMERCIAL

## 2024-08-20 ENCOUNTER — APPOINTMENT (OUTPATIENT)
Dept: OBSTETRICS AND GYNECOLOGY | Facility: CLINIC | Age: 64
End: 2024-08-20
Payer: COMMERCIAL

## 2024-08-20 VITALS
WEIGHT: 126 LBS | BODY MASS INDEX: 24.74 KG/M2 | DIASTOLIC BLOOD PRESSURE: 86 MMHG | HEIGHT: 60 IN | SYSTOLIC BLOOD PRESSURE: 124 MMHG

## 2024-08-20 DIAGNOSIS — N95.0 PMB (POSTMENOPAUSAL BLEEDING): ICD-10-CM

## 2024-08-20 PROCEDURE — 76856 US EXAM PELVIC COMPLETE: CPT | Performed by: OBSTETRICS & GYNECOLOGY

## 2024-08-20 PROCEDURE — 3008F BODY MASS INDEX DOCD: CPT | Performed by: OBSTETRICS & GYNECOLOGY

## 2024-08-20 PROCEDURE — 1036F TOBACCO NON-USER: CPT | Performed by: OBSTETRICS & GYNECOLOGY

## 2024-08-20 PROCEDURE — 76830 TRANSVAGINAL US NON-OB: CPT | Performed by: OBSTETRICS & GYNECOLOGY

## 2024-08-20 PROCEDURE — 99213 OFFICE O/P EST LOW 20 MIN: CPT | Performed by: OBSTETRICS & GYNECOLOGY

## 2024-08-20 RX ORDER — CITALOPRAM 10 MG/1
10 TABLET ORAL DAILY
COMMUNITY

## 2024-08-20 ASSESSMENT — PAIN SCALES - GENERAL: PAINLEVEL: 0-NO PAIN

## 2024-08-20 NOTE — PROGRESS NOTES
Chief Complaint   Patient presents with    Procedure     Endo biopsy with ultrasound         Slight PM bleeding (brownish) last week.  Disc.  Todays usn shows endometrium 17 mm    Therefore endometrial bx recommended    Patient ID: Manuela Pop is a 64 y.o. female.    Endometrial biopsy    Date/Time: 8/20/2024 3:49 PM    Performed by: Shahbaz Alonzo MD  Authorized by: Shahbaz Alonzo MD    Consent:     Consent obtained: verbal    Consent given by: patient    Risks discussed: bleeding, infection, need for repeat procedure and pain    Alternatives discussed: observation    Patient agrees, verbalizes understanding, and wants to proceed: yes    Indications:     Indications: postmenopausal bleeding    Pre-procedure:     Urine pregnancy test: N/A    Procedure:     Prepped with: Betadine    Tenaculum used: yes    Comments:     Procedure comments: Cervix is visually stenotic.  Unable to pass Pap brush.  Unable to pass smallest dilator.    Will need hysteroscopy D&C.  All reviewed with patient and planning on having this done next week.

## 2024-08-22 ENCOUNTER — PREP FOR PROCEDURE (OUTPATIENT)
Dept: OBSTETRICS AND GYNECOLOGY | Facility: CLINIC | Age: 64
End: 2024-08-22
Payer: COMMERCIAL

## 2024-08-22 ENCOUNTER — TELEPHONE (OUTPATIENT)
Dept: OBSTETRICS AND GYNECOLOGY | Facility: CLINIC | Age: 64
End: 2024-08-22
Payer: COMMERCIAL

## 2024-08-22 DIAGNOSIS — N88.2 STRICTURE AND STENOSIS OF CERVIX: ICD-10-CM

## 2024-08-22 DIAGNOSIS — N95.0 POSTMENOPAUSAL BLEEDING: Primary | ICD-10-CM

## 2024-08-22 RX ORDER — CELECOXIB 400 MG/1
400 CAPSULE ORAL ONCE
Status: CANCELLED | OUTPATIENT
Start: 2024-08-22 | End: 2024-08-22

## 2024-08-22 RX ORDER — SODIUM CHLORIDE, SODIUM LACTATE, POTASSIUM CHLORIDE, CALCIUM CHLORIDE 600; 310; 30; 20 MG/100ML; MG/100ML; MG/100ML; MG/100ML
20 INJECTION, SOLUTION INTRAVENOUS CONTINUOUS
Status: CANCELLED | OUTPATIENT
Start: 2024-08-29

## 2024-08-22 RX ORDER — GABAPENTIN 600 MG/1
600 TABLET ORAL ONCE
Status: CANCELLED | OUTPATIENT
Start: 2024-08-22 | End: 2024-08-22

## 2024-08-22 RX ORDER — ACETAMINOPHEN 325 MG/1
975 TABLET ORAL ONCE
Status: CANCELLED | OUTPATIENT
Start: 2024-08-22 | End: 2024-08-22

## 2024-08-22 NOTE — TELEPHONE ENCOUNTER
Patient called to check on when surgery is scheduled.  Also would like to know if she needs antibiotics prior to surgery due to her having had a knee replacement?    Faina Scott MA

## 2024-08-26 DIAGNOSIS — Z01.818 PREOP TESTING: Primary | ICD-10-CM

## 2024-08-27 ENCOUNTER — PRE-ADMISSION TESTING (OUTPATIENT)
Dept: PREADMISSION TESTING | Facility: HOSPITAL | Age: 64
End: 2024-08-27
Payer: COMMERCIAL

## 2024-08-27 VITALS
WEIGHT: 125.44 LBS | DIASTOLIC BLOOD PRESSURE: 79 MMHG | BODY MASS INDEX: 24.63 KG/M2 | HEART RATE: 72 BPM | OXYGEN SATURATION: 98 % | RESPIRATION RATE: 18 BRPM | TEMPERATURE: 96.8 F | HEIGHT: 60 IN | SYSTOLIC BLOOD PRESSURE: 134 MMHG

## 2024-08-27 DIAGNOSIS — N95.0 POSTMENOPAUSAL BLEEDING: ICD-10-CM

## 2024-08-27 DIAGNOSIS — N95.0 POSTMENOPAUSE BLEEDING: ICD-10-CM

## 2024-08-27 DIAGNOSIS — Z01.818 PREOP TESTING: Primary | ICD-10-CM

## 2024-08-27 DIAGNOSIS — N88.2 STRICTURE AND STENOSIS OF CERVIX: ICD-10-CM

## 2024-08-27 LAB
ABO GROUP (TYPE) IN BLOOD: NORMAL
ABO GROUP (TYPE) IN BLOOD: NORMAL
ANION GAP SERPL CALC-SCNC: 14 MMOL/L (ref 10–20)
ANTIBODY SCREEN: NORMAL
BUN SERPL-MCNC: 13 MG/DL (ref 6–23)
CALCIUM SERPL-MCNC: 9.6 MG/DL (ref 8.6–10.3)
CHLORIDE SERPL-SCNC: 106 MMOL/L (ref 98–107)
CO2 SERPL-SCNC: 23 MMOL/L (ref 21–32)
CREAT SERPL-MCNC: 0.74 MG/DL (ref 0.5–1.05)
EGFRCR SERPLBLD CKD-EPI 2021: >90 ML/MIN/1.73M*2
ERYTHROCYTE [DISTWIDTH] IN BLOOD BY AUTOMATED COUNT: 13.4 % (ref 11.5–14.5)
GLUCOSE SERPL-MCNC: 86 MG/DL (ref 74–99)
HCT VFR BLD AUTO: 44.3 % (ref 36–46)
HGB BLD-MCNC: 15.1 G/DL (ref 12–16)
MCH RBC QN AUTO: 29 PG (ref 26–34)
MCHC RBC AUTO-ENTMCNC: 34.1 G/DL (ref 32–36)
MCV RBC AUTO: 85 FL (ref 80–100)
NRBC BLD-RTO: 0 /100 WBCS (ref 0–0)
PLATELET # BLD AUTO: 338 X10*3/UL (ref 150–450)
POTASSIUM SERPL-SCNC: 4.4 MMOL/L (ref 3.5–5.3)
RBC # BLD AUTO: 5.2 X10*6/UL (ref 4–5.2)
RH FACTOR (ANTIGEN D): NORMAL
RH FACTOR (ANTIGEN D): NORMAL
SODIUM SERPL-SCNC: 139 MMOL/L (ref 136–145)
WBC # BLD AUTO: 6.4 X10*3/UL (ref 4.4–11.3)

## 2024-08-27 PROCEDURE — 99204 OFFICE O/P NEW MOD 45 MIN: CPT | Performed by: NURSE PRACTITIONER

## 2024-08-27 PROCEDURE — 86901 BLOOD TYPING SEROLOGIC RH(D): CPT

## 2024-08-27 PROCEDURE — 36415 COLL VENOUS BLD VENIPUNCTURE: CPT

## 2024-08-27 PROCEDURE — 85027 COMPLETE CBC AUTOMATED: CPT

## 2024-08-27 PROCEDURE — 80048 BASIC METABOLIC PNL TOTAL CA: CPT

## 2024-08-27 ASSESSMENT — DUKE ACTIVITY SCORE INDEX (DASI)
CAN YOU PARTICIPATE IN STRENOUS SPORTS LIKE SWIMMING, SINGLES TENNIS, FOOTBALL, BASKETBALL, OR SKIING: NO
CAN YOU HAVE SEXUAL RELATIONS: YES
CAN YOU TAKE CARE OF YOURSELF (EAT, DRESS, BATHE, OR USE TOILET): YES
CAN YOU DO LIGHT WORK AROUND THE HOUSE LIKE DUSTING OR WASHING DISHES: YES
CAN YOU DO MODERATE WORK AROUND THE HOUSE LIKE VACUUMING, SWEEPING FLOORS OR CARRYING GROCERIES: YES
CAN YOU CLIMB A FLIGHT OF STAIRS OR WALK UP A HILL: YES
CAN YOU RUN A SHORT DISTANCE: YES
TOTAL_SCORE: 50.7
CAN YOU DO HEAVY WORK AROUND THE HOUSE LIKE SCRUBBING FLOORS OR LIFTING AND MOVING HEAVY FURNITURE: YES
CAN YOU PARTICIPATE IN MODERATE RECREATIONAL ACTIVITIES LIKE GOLF, BOWLING, DANCING, DOUBLES TENNIS OR THROWING A BASEBALL OR FOOTBALL: YES
DASI METS SCORE: 9
CAN YOU WALK INDOORS, SUCH AS AROUND YOUR HOUSE: YES
CAN YOU DO YARD WORK LIKE RAKING LEAVES, WEEDING OR PUSHING A MOWER: YES
CAN YOU WALK A BLOCK OR TWO ON LEVEL GROUND: YES

## 2024-08-27 ASSESSMENT — PAIN SCALES - GENERAL: PAINLEVEL_OUTOF10: 0 - NO PAIN

## 2024-08-27 ASSESSMENT — PAIN - FUNCTIONAL ASSESSMENT: PAIN_FUNCTIONAL_ASSESSMENT: 0-10

## 2024-08-27 NOTE — PREPROCEDURE INSTRUCTIONS
Medication List            Accurate as of August 27, 2024  1:42 PM. Always use your most recent med list.                acetaminophen 500 mg tablet  Commonly known as: Tylenol  Medication Adjustments for Surgery: Other (Comment)  Notes to patient: Continue as prescribed     CALCIUM CARBONATE-VIT D3-MIN ORAL  Medication Adjustments for Surgery: Stop 7 days before surgery     cholecalciferol 25 MCG (1000 UT) tablet  Commonly known as: Vitamin D-3  Medication Adjustments for Surgery: Stop 7 days before surgery     citalopram 10 mg tablet  Commonly known as: CeleXA  Medication Adjustments for Surgery: Stop 7 days before surgery     clobetasol 0.05 % cream  Commonly known as: Temovate  Medication Adjustments for Surgery: Other (Comment)  Notes to patient: Continue as prescribed     famotidine 40 mg tablet  Commonly known as: Pepcid  Medication Adjustments for Surgery: Other (Comment)  Notes to patient: Continue as prescribed     ibuprofen 800 mg tablet  Medication Adjustments for Surgery: Stop 7 days before surgery     magnesium (as gluconate) 27 mg magnesium (500 mg) tablet  Commonly known as: Magonate  Medication Adjustments for Surgery: Other (Comment)  Notes to patient: Hold day of surgery     tretinoin 0.025 % cream  Commonly known as: Retin-A  Medication Adjustments for Surgery: Other (Comment)  Notes to patient: Continue as prescribed - Avoid surgical site                              NPO Instructions:    Do not eat any food after midnight the night before your surgery/procedure.    Additional Instructions:     Day of Surgery:  Review your medication instructions, take indicated medications  You may have clear liquids until TWO hours before surgery/procedure.  This includes water, black tea/coffee, (no milk or cream) apple juice and electrolyte drinks (Gatorade)  Wear  comfortable loose fitting clothing  Do not use moisturizers, creams, lotions or perfume  All jewelry and valuables should be left at  home          PRE-OPERATIVE INSTRUCTIONS FOR SURGERY    *Do not eat anything after midnight the night of surgery.  This includes food of any kind (including hard candy, cough drops, mints).   You may have up to 13.5 ounces of clear liquid  until TWO hours prior to your arrival time to the hospital unless ERAS* protocol is ordered for you.  Clear liquids include water, black tea/coffee, (no milk or cream) apple juice and electrolyte drinks (GATORADE).  You may chew gum until TWO hours prior you your surgery/procedure.     *ERAS protocol: follow as instructed.  DO not drink an additional 13.5 ounces as noted above.        *One of our staff members will call you ONE business day before your surgery, between 11am-2 pm to let you know the time to arrive.  If you have not received a call by 2 pm, call [phone]  *When you arrive at the hospital-->GO TO Registration on the ground floor  *Stop smoking 24 hours prior to surgery.  No Marijuana, CBD Oil or Vaping for 48 hours  *No alcohol 24 hours prior to surgery  *You will need a responsible adult to drive you home  -No acrylic nails or nail polish on at least one fingernail, NO polish on toes for foot surgery  -You may be asked to remove your dentures, partial plate, eyeglasses or contact lenses before going to surgery.  Please bring a case for these items.  -Body piercings need to be removed.  Jewelry and valuables should be left at home.  -Put on loose,  comfortable, clean clothing, that will accommodate bandages      What you may be asked to bring to surgery:  ___Crutches, walker  ___CPAP machine  ___Urine specimen

## 2024-08-27 NOTE — H&P (VIEW-ONLY)
CPM/PAT Evaluation       Name: Manuela Pop (Manuela Pop)  /Age: 1960/64 y.o.     In-Person       Chief Complaint: Postmenopausal bleeding    64 yr old female with c/o postmenopausal bleeding.  Reports one episode of intermittent spotting, described as brownish mucousy discharge upon wiping after voiding occurring about 2 weeks ago.  Denies any episodes since and denies any associated symptoms including cramping, pelvic pain,pressure or discomfort.  Denies any changes in urinary habits.  Reports some increased frequency in bowel movements per day and has experienced unintentional weight loss of 10 lbs since February of this year.  States belief that is it related to two recent surgeries, Right knee replacement on 2024 and tummy tuck on 2024.  Reports only some abdominal tenderness related to tummy tuck.  States has been postmenopausal for about 5 years now with no symptoms except for hot flashes which have since mostly resolved.  Has since followed up with GYN including ultrasound showing 17 mm endometrium with recommendation for endometrial biopsy.  Reports remaining otherwise physically active, routinely exercising 5-6x per week; denies cardiac or respiratory symptoms.  Reports one episode of post op slow emergence.      Followed by PCP (ANYI Gonsales) - last visit 2024          Past Medical History:   Diagnosis Date    Anesthesia of skin 2022    Numbness and tingling    Anxiety     Encounter for screening for other viral diseases 2018    Need for hepatitis C screening test    GERD (gastroesophageal reflux disease)     Mastodynia 2021    Breast pain, left    Osteoarthritis     Other specified soft tissue disorders     Calf swelling    Personal history of other diseases of the musculoskeletal system and connective tissue 2022    History of arthritis       Past Surgical History:   Procedure Laterality Date    BREAST SURGERY  2015    Breast  Surgery Reduction Procedure    COSMETIC SURGERY      tummy tu    HAND SURGERY      HERNIA REPAIR  08/19/2015    Hernia Repair    OTHER SURGICAL HISTORY  08/19/2015    Treatment Of The Left Foot    OTHER SURGICAL HISTORY  08/19/2015    Treatment Of The Right Foot    OTHER SURGICAL HISTORY  07/19/2022    Bunionectomy    OTHER SURGICAL HISTORY  07/19/2022    Carpal tunnel surgery    OTHER SURGICAL HISTORY  07/19/2022    Knee surgery    REPLACEMENT TOTAL KNEE ONCOLOGIC         Patient  reports being sexually active and has had partner(s) who are male. She reports using the following methods of birth control/protection: Post-menopausal and None.    Family History   Problem Relation Name Age of Onset    Lung cancer Father      Pancreatic cancer Father's Sister      Cancer Other Multiple Family Members     Breast cancer Other Cousin        Allergies   Allergen Reactions    Potassium Dichromate Rash     Positive patch testing    Silver Rash       Prior to Admission medications    Medication Sig Start Date End Date Taking? Authorizing Provider   acetaminophen (Tylenol) 500 mg tablet Take 1 tablet (500 mg) by mouth every 6 hours if needed for mild pain (1 - 3).    Historical Provider, MD   calcium carb/vit D3/minerals (CALCIUM CARBONATE-VIT D3-MIN ORAL) Take 1 tablet by mouth once daily.    Historical Provider, MD   cholecalciferol (Vitamin D-3) 25 MCG (1000 UT) tablet Take 1 tablet (1,000 Units) by mouth once daily.    Historical Provider, MD   citalopram (CeleXA) 10 mg tablet Take 1 tablet (10 mg) by mouth once daily.    Historical Provider, MD   clobetasol (Temovate) 0.05 % cream  3/28/24   Historical Provider, MD   famotidine (Pepcid) 40 mg tablet Take 1 tablet (40 mg) by mouth once daily as needed for heartburn or indigestion.    Historical Provider, MD   ibuprofen 800 mg tablet Take 800 mg by mouth every 8 hours if needed for mild pain (1 - 3) or moderate pain (4 - 6). Take 800 mg up to 3x daily as needed for pain   Indications: pain    Collins Ferrari MD   magnesium, as gluconate, (Magonate) 27 mg magnesium (500 mg) tablet Take 1 tablet (27 mg) by mouth once daily.    Historical Provider, MD   tretinoin (Retin-A) 0.025 % cream Apply a pea sized amount to face every other night adding a night as tolerated for nightly use.  Cover with a moisturizer    Historical Provider, MD        Review of Systems    Constitutional: no fever, no chills and not feeling poorly.   Eyes: no eyesight problems.   ENT: no hearing loss, no nosebleeds and no sore throat.   Cardiovascular: no chest pain, no palpitations and no extremity edema.   Respiratory: no shortness of breath, no wheezing, no cough and no sob w/exertion.   Gastrointestinal: negative for abdominal pain, blood in stools or changes in bowel habits   Genitourinary: negative for dysuria, incontinence or changes in urinary habits   Musculoskeletal: no arthralgias and no gait abnormality.   Integumentary: negative for lesions, rash or itching.   Neurological: negative for confusion, dizziness or fainting.   Psychiatric: not suicidal, no anxiety and no depression.   All other systems have been reviewed and are negative for complaint.     Physical Exam  Constitutional:       General: No acute distress.     Aox3, pleasant and cooperative, appropriate mood and eye contact   HENT:      Head: Normocephalic.      Mouth/Throat: Mucous membranes moist & pink  Eyes:      Vision grossly intact, PERRLA, corrective lenses    Neck:      No carotid bruit, no JVD  Cardiovascular:      RRR, S1S2, no murmurs, rubs or gallops  Pulmonary:      Symmetric chest expansion, CTA, Room Air  Abdominal:      Soft non-tender, BSx4   Skin:     Warm, dry & intact   Extremities:      No gross deformities; normal gait   Neurological:      No focal deficit, Aox3, MARIE x4  Psychiatric:      Pleasant & cooperative, appropriate affect    PAT AIRWAY:   Airway:     Mallampati::  I    TM distance::  >3 FB    Neck ROM::  Full    Right upper   implants      Visit Vitals  /79   Pulse 72   Temp 36 °C (96.8 °F) (Temporal)   Resp 18       DASI Risk Score      Flowsheet Row Most Recent Value   DASI SCORE 50.7   METS Score (Will be calculated only when all the questions are answered) 9          Caprini DVT Assessment    No data to display       Modified Frailty Index    No data to display       CHADS2 Stroke Risk  Current as of 45 minutes ago        N/A 3 to 100%: High Risk   2 to < 3%: Medium Risk   0 to < 2%: Low Risk     Last Change: N/A          This score determines the patient's risk of having a stroke if the patient has atrial fibrillation.        This score is not applicable to this patient. Components are not calculated.          Revised Cardiac Risk Index    No data to display       Apfel Simplified Score    No data to display       Risk Analysis Index Results This Encounter    No data found in the last 1 encounters.       Stop Bang Score      Flowsheet Row Most Recent Value   Do you snore loudly? 0   Do you often feel tired or fatigued after your sleep? 0   Has anyone ever observed you stop breathing in your sleep? 0   Do you have or are you being treated for high blood pressure? 0   Recent BMI (Calculated) 24.5   Is BMI greater than 35 kg/m2? 0=No   Age older than 50 years old? 1=Yes   Is your neck circumference greater than 17 inches (Male) or 16 inches (Female)? 0   Gender - Male 0=No   STOP-BANG Total Score 1            Assessment and Plan:     Followed by GYN, Postmenopausal bleeding, Stricture and stenosis of cervix    Hysteroscopy/Dilation & Curettage w/Dr Alonzo on 8/29/2024    Ecg dated 1/17/2024 reviewed - NSR (73 bpm)  Medical hx and recent labs reviewed  Medications addressed w/pre-op instructions provided

## 2024-08-29 ENCOUNTER — ANESTHESIA (OUTPATIENT)
Dept: OPERATING ROOM | Facility: HOSPITAL | Age: 64
End: 2024-08-29
Payer: COMMERCIAL

## 2024-08-29 ENCOUNTER — ANESTHESIA EVENT (OUTPATIENT)
Dept: OPERATING ROOM | Facility: HOSPITAL | Age: 64
End: 2024-08-29
Payer: COMMERCIAL

## 2024-08-29 ENCOUNTER — HOSPITAL ENCOUNTER (OUTPATIENT)
Facility: HOSPITAL | Age: 64
Setting detail: OUTPATIENT SURGERY
Discharge: HOME | End: 2024-08-29
Attending: OBSTETRICS & GYNECOLOGY | Admitting: OBSTETRICS & GYNECOLOGY
Payer: COMMERCIAL

## 2024-08-29 VITALS
TEMPERATURE: 97.3 F | RESPIRATION RATE: 16 BRPM | SYSTOLIC BLOOD PRESSURE: 118 MMHG | HEART RATE: 58 BPM | OXYGEN SATURATION: 98 % | DIASTOLIC BLOOD PRESSURE: 69 MMHG

## 2024-08-29 DIAGNOSIS — N95.0 POSTMENOPAUSAL BLEEDING: Primary | ICD-10-CM

## 2024-08-29 DIAGNOSIS — N88.2 STRICTURE AND STENOSIS OF CERVIX: ICD-10-CM

## 2024-08-29 PROCEDURE — A58558 PR HYSTEROSCOPY,W/ENDO BX: Performed by: ANESTHESIOLOGY

## 2024-08-29 PROCEDURE — 2500000004 HC RX 250 GENERAL PHARMACY W/ HCPCS (ALT 636 FOR OP/ED): Performed by: OBSTETRICS & GYNECOLOGY

## 2024-08-29 PROCEDURE — 2500000004 HC RX 250 GENERAL PHARMACY W/ HCPCS (ALT 636 FOR OP/ED): Performed by: NURSE ANESTHETIST, CERTIFIED REGISTERED

## 2024-08-29 PROCEDURE — 3600000007 HC OR TIME - EACH INCREMENTAL 1 MINUTE - PROCEDURE LEVEL TWO: Performed by: OBSTETRICS & GYNECOLOGY

## 2024-08-29 PROCEDURE — 3700000002 HC GENERAL ANESTHESIA TIME - EACH INCREMENTAL 1 MINUTE: Performed by: OBSTETRICS & GYNECOLOGY

## 2024-08-29 PROCEDURE — 7100000001 HC RECOVERY ROOM TIME - INITIAL BASE CHARGE: Performed by: OBSTETRICS & GYNECOLOGY

## 2024-08-29 PROCEDURE — 3700000001 HC GENERAL ANESTHESIA TIME - INITIAL BASE CHARGE: Performed by: OBSTETRICS & GYNECOLOGY

## 2024-08-29 PROCEDURE — 58558 HYSTEROSCOPY BIOPSY: CPT | Performed by: OBSTETRICS & GYNECOLOGY

## 2024-08-29 PROCEDURE — 3600000002 HC OR TIME - INITIAL BASE CHARGE - PROCEDURE LEVEL TWO: Performed by: OBSTETRICS & GYNECOLOGY

## 2024-08-29 PROCEDURE — 2500000005 HC RX 250 GENERAL PHARMACY W/O HCPCS: Performed by: NURSE ANESTHETIST, CERTIFIED REGISTERED

## 2024-08-29 PROCEDURE — A58558 PR HYSTEROSCOPY,W/ENDO BX: Performed by: NURSE ANESTHETIST, CERTIFIED REGISTERED

## 2024-08-29 PROCEDURE — 2500000001 HC RX 250 WO HCPCS SELF ADMINISTERED DRUGS (ALT 637 FOR MEDICARE OP): Performed by: OBSTETRICS & GYNECOLOGY

## 2024-08-29 PROCEDURE — 7100000009 HC PHASE TWO TIME - INITIAL BASE CHARGE: Performed by: OBSTETRICS & GYNECOLOGY

## 2024-08-29 PROCEDURE — 88305 TISSUE EXAM BY PATHOLOGIST: CPT | Mod: TC,PARLAB | Performed by: OBSTETRICS & GYNECOLOGY

## 2024-08-29 PROCEDURE — 7100000002 HC RECOVERY ROOM TIME - EACH INCREMENTAL 1 MINUTE: Performed by: OBSTETRICS & GYNECOLOGY

## 2024-08-29 PROCEDURE — 7100000010 HC PHASE TWO TIME - EACH INCREMENTAL 1 MINUTE: Performed by: OBSTETRICS & GYNECOLOGY

## 2024-08-29 RX ORDER — CELECOXIB 100 MG/1
400 CAPSULE ORAL ONCE
Status: COMPLETED | OUTPATIENT
Start: 2024-08-29 | End: 2024-08-29

## 2024-08-29 RX ORDER — LIDOCAINE HYDROCHLORIDE 10 MG/ML
0.1 INJECTION INFILTRATION; PERINEURAL ONCE
Status: DISCONTINUED | OUTPATIENT
Start: 2024-08-29 | End: 2024-08-29 | Stop reason: HOSPADM

## 2024-08-29 RX ORDER — FENTANYL CITRATE 50 UG/ML
25 INJECTION, SOLUTION INTRAMUSCULAR; INTRAVENOUS EVERY 5 MIN PRN
Status: DISCONTINUED | OUTPATIENT
Start: 2024-08-29 | End: 2024-08-29 | Stop reason: HOSPADM

## 2024-08-29 RX ORDER — FENTANYL CITRATE 50 UG/ML
50 INJECTION, SOLUTION INTRAMUSCULAR; INTRAVENOUS EVERY 5 MIN PRN
Status: DISCONTINUED | OUTPATIENT
Start: 2024-08-29 | End: 2024-08-29 | Stop reason: HOSPADM

## 2024-08-29 RX ORDER — SODIUM CHLORIDE, SODIUM LACTATE, POTASSIUM CHLORIDE, CALCIUM CHLORIDE 600; 310; 30; 20 MG/100ML; MG/100ML; MG/100ML; MG/100ML
20 INJECTION, SOLUTION INTRAVENOUS CONTINUOUS
Status: DISCONTINUED | OUTPATIENT
Start: 2024-08-29 | End: 2024-08-29 | Stop reason: HOSPADM

## 2024-08-29 RX ORDER — ACETAMINOPHEN 325 MG/1
975 TABLET ORAL ONCE
Status: COMPLETED | OUTPATIENT
Start: 2024-08-29 | End: 2024-08-29

## 2024-08-29 RX ORDER — ACETAMINOPHEN 325 MG/1
1000 TABLET ORAL EVERY 6 HOURS PRN
Qty: 40 TABLET | Refills: 0 | Status: SHIPPED | OUTPATIENT
Start: 2024-08-29

## 2024-08-29 RX ORDER — GABAPENTIN 300 MG/1
600 CAPSULE ORAL ONCE
Status: COMPLETED | OUTPATIENT
Start: 2024-08-29 | End: 2024-08-29

## 2024-08-29 RX ORDER — SODIUM CHLORIDE, SODIUM LACTATE, POTASSIUM CHLORIDE, CALCIUM CHLORIDE 600; 310; 30; 20 MG/100ML; MG/100ML; MG/100ML; MG/100ML
100 INJECTION, SOLUTION INTRAVENOUS CONTINUOUS
Status: DISCONTINUED | OUTPATIENT
Start: 2024-08-29 | End: 2024-08-29 | Stop reason: HOSPADM

## 2024-08-29 RX ORDER — ONDANSETRON HYDROCHLORIDE 2 MG/ML
INJECTION, SOLUTION INTRAVENOUS AS NEEDED
Status: DISCONTINUED | OUTPATIENT
Start: 2024-08-29 | End: 2024-08-29

## 2024-08-29 RX ORDER — MEPERIDINE HYDROCHLORIDE 25 MG/ML
12.5 INJECTION INTRAMUSCULAR; INTRAVENOUS; SUBCUTANEOUS EVERY 10 MIN PRN
Status: DISCONTINUED | OUTPATIENT
Start: 2024-08-29 | End: 2024-08-29 | Stop reason: HOSPADM

## 2024-08-29 RX ORDER — IBUPROFEN 600 MG/1
600 TABLET ORAL EVERY 6 HOURS PRN
Qty: 20 TABLET | Refills: 0 | Status: SHIPPED | OUTPATIENT
Start: 2024-08-29

## 2024-08-29 RX ORDER — LIDOCAINE HCL/PF 100 MG/5ML
SYRINGE (ML) INTRAVENOUS AS NEEDED
Status: DISCONTINUED | OUTPATIENT
Start: 2024-08-29 | End: 2024-08-29

## 2024-08-29 RX ORDER — MIDAZOLAM HYDROCHLORIDE 1 MG/ML
INJECTION, SOLUTION INTRAMUSCULAR; INTRAVENOUS AS NEEDED
Status: DISCONTINUED | OUTPATIENT
Start: 2024-08-29 | End: 2024-08-29

## 2024-08-29 RX ORDER — CITALOPRAM 10 MG/1
10 TABLET ORAL DAILY
COMMUNITY

## 2024-08-29 RX ORDER — FENTANYL CITRATE 50 UG/ML
INJECTION, SOLUTION INTRAMUSCULAR; INTRAVENOUS AS NEEDED
Status: DISCONTINUED | OUTPATIENT
Start: 2024-08-29 | End: 2024-08-29

## 2024-08-29 RX ORDER — PROPOFOL 10 MG/ML
INJECTION, EMULSION INTRAVENOUS AS NEEDED
Status: DISCONTINUED | OUTPATIENT
Start: 2024-08-29 | End: 2024-08-29

## 2024-08-29 SDOH — HEALTH STABILITY: MENTAL HEALTH: CURRENT SMOKER: 0

## 2024-08-29 ASSESSMENT — PAIN SCALES - GENERAL
PAIN_LEVEL: 0
PAINLEVEL_OUTOF10: 0 - NO PAIN

## 2024-08-29 ASSESSMENT — COLUMBIA-SUICIDE SEVERITY RATING SCALE - C-SSRS
2. HAVE YOU ACTUALLY HAD ANY THOUGHTS OF KILLING YOURSELF?: NO
1. IN THE PAST MONTH, HAVE YOU WISHED YOU WERE DEAD OR WISHED YOU COULD GO TO SLEEP AND NOT WAKE UP?: NO
6. HAVE YOU EVER DONE ANYTHING, STARTED TO DO ANYTHING, OR PREPARED TO DO ANYTHING TO END YOUR LIFE?: NO

## 2024-08-29 ASSESSMENT — PAIN - FUNCTIONAL ASSESSMENT
PAIN_FUNCTIONAL_ASSESSMENT: 0-10
PAIN_FUNCTIONAL_ASSESSMENT: 0-10

## 2024-08-29 NOTE — ANESTHESIA PREPROCEDURE EVALUATION
Patient: Manuela Pop    Procedure Information       Date/Time: 08/29/24 0845    Procedure: HYSTEROSCOPY/ D & C (Vagina ) - hysteroscopy d&c  AUTH # [id number]    [facility] / [facility]    Surgeons: Shahbaz Alonzo MD            Relevant Problems   Cardiac   (+) Hyperlipidemia      Neuro   (+) Anxiety   (+) Sciatica      Liver   (+) Hepatic cyst      Musculoskeletal   (+) Unilateral primary osteoarthritis, right knee       Clinical information reviewed:    Allergies  Meds               NPO Detail:  NPO/Void Status  Date of Last Liquid: 08/28/24  Time of Last Liquid: 2000  Date of Last Solid: 08/28/24  Time of Last Solid: 2000         Physical Exam    Airway  Mallampati: II  TM distance: >3 FB  Neck ROM: full     Cardiovascular - normal exam     Dental - normal exam     Pulmonary - normal exam     Abdominal - normal exam             Anesthesia Plan    History of general anesthesia?: yes  History of complications of general anesthesia?: no    ASA 2     general     The patient is not a current smoker.  Patient was previously instructed to abstain from smoking on day of procedure.  Patient did not smoke on day of procedure.    intravenous induction   Postoperative administration of opioids is intended.  Trial extubation is planned.  Anesthetic plan and risks discussed with patient.  Use of blood products discussed with patient who consented to blood products.    Plan discussed with CRNA.

## 2024-08-29 NOTE — ANESTHESIA POSTPROCEDURE EVALUATION
Patient: Manuela Pop    Procedure Summary       Date: 08/29/24 Room / Location: [facility]    Anesthesia Start: 0850 Anesthesia Stop: 0926    Procedure: HYSTEROSCOPY/ D & C (Vagina ) Diagnosis:       Postmenopausal bleeding      Stricture and stenosis of cervix      (Postmenopausal bleeding [N95.0])      (Stricture and stenosis of cervix [N88.2])    Surgeons: Shahbaz Alonzo MD Responsible Provider: Aldair Tijerina MD    Anesthesia Type: general ASA Status: 2            Anesthesia Type: general    Vitals Value Taken Time   /78 08/29/24 0925   Temp 36.3 08/29/24 0926   Pulse 56 08/29/24 0924   Resp 14 08/29/24 0926   SpO2 100 % 08/29/24 0924   Vitals shown include unfiled device data.    Anesthesia Post Evaluation    Patient location during evaluation: PACU  Patient participation: complete - patient participated  Level of consciousness: awake and alert  Pain score: 0  Pain management: adequate  Multimodal analgesia pain management approach  Airway patency: patent  Cardiovascular status: acceptable  Respiratory status: acceptable  Hydration status: acceptable  Postoperative Nausea and Vomiting: none        No notable events documented.

## 2024-08-29 NOTE — OP NOTE
HYSTEROSCOPY/ D & C Operative Note     Date: 2024  OR Location: [facility] OR    Name: Manuela Pop, : 1960, Age: 64 y.o., MRN: [id number], Sex: female    Diagnosis  Pre-op Diagnosis      * Postmenopausal bleeding [N95.0]     * Stricture and stenosis of cervix [N88.2] Post-op Diagnosis     * Postmenopausal bleeding [N95.0]     * Stricture and stenosis of cervix [N88.2]     Procedures  HYSTEROSCOPY/ D & C  07667 - GA HYSTEROSCOPY BX ENDOMETRIUM&/POLYPC W/WO D&C      Surgeons      * Shahbaz Alonzo - Primary    Resident/Fellow/Other Assistant:  Surgeons and Role:  * No surgeons found with a matching role *    Procedure Summary  Anesthesia: General  ASA: II  Anesthesia Staff: Anesthesiologist: Aldair Tijerina MD  CRNA: KARINA Duncan-CRNA  SRNA: Iron Lugo  Estimated Blood Loss: 0mL  Intra-op Medications: Administrations occurring from 0845 to 0945 on 24:  * No intraprocedure medications in log *           Anesthesia Record               Intraprocedure I/O Totals       None           Specimen:   ID Type Source Tests Collected by Time   1 : ENDOMETRIAL CURETTINGS Tissue ENDOMETRIUM CURETTINGS SURGICAL PATHOLOGY EXAM Shahbaz Alonzo MD 2024 0909        Staff:   Circulator: Teo  Circulator: Erlinda Holloway Person: Lavern         Drains and/or Catheters: * None in log *    Tourniquet Times:         Implants:     Findings: stenotic cervix, atrophic endometrium    Indications: Manuela Pop is an 64 y.o. female who is having surgery for Postmenopausal bleeding [N95.0]  Stricture and stenosis of cervix [N88.2].     The patient was seen in the preoperative area. The risks, benefits, complications, treatment options, non-operative alternatives, expected recovery and outcomes were discussed with the patient. The possibilities of reaction to medication, pulmonary aspiration, injury to surrounding structures, bleeding, recurrent infection, the need for additional procedures, failure  to diagnose a condition, and creating a complication requiring transfusion or operation were discussed with the patient. The patient concurred with the proposed plan, giving informed consent.  The site of surgery was properly noted/marked if necessary per policy. The patient has been actively warmed in preoperative area. Preoperative antibiotics are not indicated. Venous thrombosis prophylaxis have been ordered including bilateral sequential compression devices    Procedure Details: The patient was taken to the OR where after patient led timeout identified the correct patient, procedure, and perioperative concerns, the patient underwent her anesthetic and was placed in a dorsolithotomy position.  The cervix, vagina and perineum were prepped draped usual fashion.    The cervix was exposed and grasped with a Gee tenaculum.  The cervix was dilated with first lacrimal dilators then hanks dilators to allow passage of a 5 mm hysteroscope.  Using saline as the distention medium, hysteroscopy was performed.  The following findings were noted.  Atrophic endometrium, both tubal ostia identified.  No submucosal fibroids or polyps.    Thorough D&C was then undertaken with return of scant endometrioid like tissue.  This was sent for pathologic evaluation.  All instruments and the anterior cervical tenaculum were removed..  Single figure of 8 of 0 vicryl utilized to secure hemostasis at the tenaculum site.     The patient returned to recovery room in stable condition.   Complications:  None; patient tolerated the procedure well.    Disposition: PACU - hemodynamically stable.  Condition: stable         Additional Details:     Attending Attestation:     Shahbaz Alonzo  Phone Number: [phone]

## 2024-08-29 NOTE — ANESTHESIA PROCEDURE NOTES
Airway  Date/Time: 8/29/2024 8:55 AM  Urgency: elective    Airway not difficult    Staffing  Performed: CRNA   Authorized by: Aldair Tijerina MD    Performed by: KARINA Duncan-ARIADNE  Patient location during procedure: OR    Indications and Patient Condition  Indications for airway management: anesthesia  Spontaneous ventilation: present  Sedation level: deep  Preoxygenated: yes  Patient position: sniffing  Mask difficulty assessment: 0 - not attempted  Planned trial extubation    Final Airway Details  Final airway type: supraglottic airway      Successful airway: Size 3     Number of attempts at approach: 1

## 2024-08-29 NOTE — ANESTHESIA POSTPROCEDURE EVALUATION
Patient: Manuela Pop    Procedure Summary       Date: 08/29/24 Room / Location: [facility] / [facility]    Anesthesia Start:  Anesthesia Stop:     Procedure: HYSTEROSCOPY/ D & C (Vagina ) Diagnosis:       Postmenopausal bleeding      Stricture and stenosis of cervix      (Postmenopausal bleeding [N95.0])      (Stricture and stenosis of cervix [N88.2])    Surgeons: Shahbaz Alonzo MD Responsible Provider:     Anesthesia Type: general ASA Status: 2            Anesthesia Type: general    Vitals Value Taken Time   BP *** 08/29/24 0849   Temp *** 08/29/24 0849   Pulse *** 08/29/24 0849   Resp *** 08/29/24 0849   SpO2 *** 08/29/24 0849       Anesthesia Post Evaluation    No notable events documented.

## 2024-08-29 NOTE — DISCHARGE INSTRUCTIONS
For pain      Tylenol 975 mg (325mg * 3) or 1000mg (500 mg * 2) every 6 hours as needed    And    Ibuprofen 600mg every 6 hours as needed

## 2024-09-06 LAB
LABORATORY COMMENT REPORT: NORMAL
PATH REPORT.COMMENTS IMP SPEC: NORMAL
PATH REPORT.FINAL DX SPEC: NORMAL
PATH REPORT.GROSS SPEC: NORMAL
PATH REPORT.RELEVANT HX SPEC: NORMAL
PATH REPORT.TOTAL CANCER: NORMAL

## 2024-11-26 ENCOUNTER — OFFICE VISIT (OUTPATIENT)
Dept: ORTHOPEDIC SURGERY | Facility: CLINIC | Age: 64
End: 2024-11-26
Payer: COMMERCIAL

## 2024-11-26 DIAGNOSIS — Z96.659 S/P TOTAL KNEE ARTHROPLASTY, UNSPECIFIED LATERALITY: ICD-10-CM

## 2024-11-26 PROCEDURE — 99212 OFFICE O/P EST SF 10 MIN: CPT | Performed by: ORTHOPAEDIC SURGERY

## 2024-11-26 NOTE — PROGRESS NOTES
History of Present Illness:  Patient with known osteoarthritis of the knee who presents today for repeat evaluation.  The patient notes persistent pain as well as some occasional mechanical symptoms.  The patient has tried the following modalities: Rest, ice, anti-inflammatories.    Patient states the right knee status post arthroplasty feels very good, she notes occasional range of motion loss compared to her native knee, but is progressing nicely.  She did very well with gel injections on the right side prior to surgery, and being she has a very symmetric degree of wear-and-tear for the left knee she would like to discuss options for viscosupplementation for the left side today.    Review of Systems:   GENERAL: Negative for malaise, significant weight loss, fever  MUSCULOSKELETAL: see HPI  NEURO:  Negative    Physical Examination:  Left Knee:  Skin healthy and intact  No gross swelling or ecchymosis  Alignment: Varus  Effusion: Trace  ROM: 0-130  Crepitance with range of motion  No pain with internal rotation of the hip  Tenderness to palpation: over medial and lateral joint line and with patellar compression     No laxity to valgus stress  No laxity to varus stress  Negative Lachman´s test  Negative posterior drawer test  Mild pain with Juani´s test    Neurovascular exam normal distally  2+ DP pulse and good cap refill    Imaging:  Reviewed, degenerative joint disease noted moderate medial DJD, minimal changes elsewhere.    Assessment:  Patient with known osteoarthritis of the left knee    Plan:  We reviewed an evidence-based approach to OA of the knee.  We discussed past treatments as well options for future treatment.  We discussed NSAIDS (risks and benefits), low impact activities.    We will submit for authorization of viscosupplementation injection for left knee.     Patient has a diagnosis of knee osteoarthritis supported by radiological evidence.  There is partially preserved joint space in the  patellofemoral, medial and lateral compartments of the knee on weight-bearing AP x-rays within the past two years.  Pain interferes with functional activities.  Patient has failed to respond to 6 months of treatments including activity modification, home exercise, protective weightbearing and physical therapy.  Patient has failed to respond adequately to at least 6 months trials two analgesics.  Patient has failed to respond to an adequate trial of intra-articular steroid injection with less than six weeks of pain improvement.    Raoul Sheffield PA-C        In a face to face encounter, I evaluated the patient and performed a physical examination, discussed pertinent diagnostic studies if indicated and discussed diagnosis and management strategies with both the patient and physician assistant / nurse practitioner.  I reviewed the PA/NP's note and agree with the documented findings and plan of care.        Collins Ferrari MD

## 2025-04-03 ENCOUNTER — APPOINTMENT (OUTPATIENT)
Dept: ORTHOPEDIC SURGERY | Facility: CLINIC | Age: 65
End: 2025-04-03
Payer: COMMERCIAL

## 2025-04-17 ENCOUNTER — APPOINTMENT (OUTPATIENT)
Dept: ORTHOPEDIC SURGERY | Facility: CLINIC | Age: 65
End: 2025-04-17
Payer: COMMERCIAL

## 2025-04-17 DIAGNOSIS — M25.569 KNEE PAIN, UNSPECIFIED CHRONICITY, UNSPECIFIED LATERALITY: Primary | ICD-10-CM

## 2025-04-17 DIAGNOSIS — M65.332 TRIGGER MIDDLE FINGER OF LEFT HAND: ICD-10-CM

## 2025-04-17 PROCEDURE — 20610 DRAIN/INJ JOINT/BURSA W/O US: CPT | Performed by: ORTHOPAEDIC SURGERY

## 2025-04-17 PROCEDURE — 99213 OFFICE O/P EST LOW 20 MIN: CPT | Performed by: ORTHOPAEDIC SURGERY

## 2025-04-17 PROCEDURE — 20550 NJX 1 TENDON SHEATH/LIGAMENT: CPT | Performed by: ORTHOPAEDIC SURGERY

## 2025-04-17 PROCEDURE — 1036F TOBACCO NON-USER: CPT | Performed by: ORTHOPAEDIC SURGERY

## 2025-04-17 RX ORDER — TRIAMCINOLONE ACETONIDE 40 MG/ML
20 INJECTION, SUSPENSION INTRA-ARTICULAR; INTRAMUSCULAR
Status: COMPLETED | OUTPATIENT
Start: 2025-04-17 | End: 2025-04-17

## 2025-04-17 RX ORDER — LIDOCAINE HYDROCHLORIDE 10 MG/ML
0.5 INJECTION, SOLUTION INFILTRATION; PERINEURAL
Status: COMPLETED | OUTPATIENT
Start: 2025-04-17 | End: 2025-04-17

## 2025-04-17 RX ADMIN — TRIAMCINOLONE ACETONIDE 20 MG: 40 INJECTION, SUSPENSION INTRA-ARTICULAR; INTRAMUSCULAR at 12:22

## 2025-04-17 RX ADMIN — LIDOCAINE HYDROCHLORIDE 0.5 ML: 10 INJECTION, SOLUTION INFILTRATION; PERINEURAL at 12:22

## 2025-04-17 NOTE — PROGRESS NOTES
History of Present Illness:  Patient returns today for evaluation of  known trigger finger of the left long. The patient endorses persistent catching of the digit with flexion. The patient endorses localized, focal pain when it catches / sharp in nature.  The patient denies any numbness and tingling.    He is also here for viscosupplementation of her left knee    Review of Systems   GENERAL: Negative for malaise, significant weight loss, fever  MUSCULOSKELETAL: see HPI  NEURO:  Negative    Physical Examination:  Left hand:  No obvious atrophy, no skin changes  Tenderness, palpable nodule along the flexor tendon sheath with passive ROM  Positive triggering with active flexion and extension   Normal neurovascular exam distally    Left knee, no significant effusion good range of motion      Assessment:  Patient with left long digit trigger finger, knee OA    Plan:  Trigger finger  We reviewed the disease process with the patient including the etiology of trigger fingers.    We discussed the role for injections and limitations / concern for atrophy with repeated injections.     We discussed surgical intervention reviewing the risks (bleeding, neurologic injury, wound issues including infection, and recurrence) and benefits.  The patient elected for: Steroid injection today.      L Knee: Supplementation today    L Inj/Asp: L knee on 4/17/2025 12:22 PM  Indications: pain  Details: 22 G needle, anteromedial approach  Medications: 48 mg hylan 48 mg/6 mL  Outcome: tolerated well, no immediate complications  Procedure, treatment alternatives, risks and benefits explained, specific risks discussed. Consent was given by the patient. Immediately prior to procedure a time out was called to verify the correct patient, procedure, equipment, support staff and site/side marked as required. Patient was prepped and draped in the usual sterile fashion.       Hand / UE Inj/Asp: L long A1 for trigger finger on 4/17/2025 12:22  PM  Indications: pain  Details: 22 G needle, volar approach  Medications: 20 mg triamcinolone acetonide 40 mg/mL; 0.5 mL lidocaine 10 mg/mL (1 %)  Outcome: tolerated well, no immediate complications  Procedure, treatment alternatives, risks and benefits explained, specific risks discussed. Consent was given by the patient. Immediately prior to procedure a time out was called to verify the correct patient, procedure, equipment, support staff and site/side marked as required. Patient was prepped and draped in the usual sterile fashion.

## 2025-05-09 ENCOUNTER — APPOINTMENT (OUTPATIENT)
Dept: OBSTETRICS AND GYNECOLOGY | Facility: CLINIC | Age: 65
End: 2025-05-09
Payer: COMMERCIAL

## 2025-05-09 VITALS — BODY MASS INDEX: 24.74 KG/M2 | HEIGHT: 60 IN | WEIGHT: 126 LBS

## 2025-05-09 DIAGNOSIS — N63.20 MASS OF LEFT BREAST, UNSPECIFIED QUADRANT: ICD-10-CM

## 2025-05-09 PROCEDURE — 99214 OFFICE O/P EST MOD 30 MIN: CPT | Performed by: OBSTETRICS & GYNECOLOGY

## 2025-05-09 PROCEDURE — 1036F TOBACCO NON-USER: CPT | Performed by: OBSTETRICS & GYNECOLOGY

## 2025-05-09 PROCEDURE — 3008F BODY MASS INDEX DOCD: CPT | Performed by: OBSTETRICS & GYNECOLOGY

## 2025-05-09 RX ORDER — CITALOPRAM 20 MG/1
1 TABLET, FILM COATED ORAL
COMMUNITY
Start: 2025-05-01

## 2025-05-09 ASSESSMENT — PATIENT HEALTH QUESTIONNAIRE - PHQ9
SUM OF ALL RESPONSES TO PHQ9 QUESTIONS 1 AND 2: 0
2. FEELING DOWN, DEPRESSED OR HOPELESS: NOT AT ALL
1. LITTLE INTEREST OR PLEASURE IN DOING THINGS: NOT AT ALL

## 2025-05-09 NOTE — PROGRESS NOTES
Chief Complaint   Patient presents with    Breast Problem     Breast Lump    [id number]    C/O feeling a lump in right breast a couple weeks ago and also a lump in her left armpit.    Chaperone declined.       Over the last 2 weeks patient has felt a right upper inner quadrant lump that came and went.  No longer there today.  Also felt a left axillary lump which is present still but smaller than it was 2 days ago.    REVIEW OF SYSTEMS    Please see HPI for reported pertinent positives, which would supersede this ROS    Constitutional:  Denies fever, chills, wt gain or loss, fatigue    Genito-Urinary:  Denies genital lesion or sores, vaginal dryness, itching  or pain.  No abnormal vaginal discharge or unexplained vaginal bleeding.  No dysuria, urinary incontinence or frequency.  Denies pelvic pain, dysmenorrhea or dyspareunia.    Eyes:  Denies vision changes, dryness  ENT:  No hearing loss, sinus pain or congestion, nosebleeds  Cardiovascular:  No chest pain or palpitations  Respiratory:  No SOB, cough, wheezing  GI:  No Nausea, vomiting, diarrhea, constipation, abdominal pain  Musculoskeletal:  No new back pain. joint pain, peripheral edema  Skin:  No rash or skin lesion  Neurologic:  No HA, numbness or dizziness  Psychiatric:  No new anxiety or depression  Endocrine:  No loss of hair or hirsutism  Hematologic/lymphatic:  No swollen lymph nodes.  No reported tendency for easy bruising or bleeding    Patient admits to no other systemic complaints      PHYSICAL EXAM:    PSYCH:  Pt is alert, oriented and cooperative    SKIN: warm, dry, w/o lesion    HEAD AND FACE:  External inspection of eyes, ears, functional cranial nerves normal and intact    THYROID:  No thyromegaly    CARDIOVASCULAR:  Warm and well Perfused    PULMONARY:  No respiratory distress    ABDOMEN:  soft, nontender.  No mass or organomegaly.      BREAST:  Breasts are symmetric to inspection and palpation.  No mass palpable bilaterally.  There is no  axillary lymphadenopathy  In the left axilla there is a 8 mm epidermal cyst clearly associated with the epidermis/dermis rather than the axillary tissue underneath.    Assessment/Plan    Diagnoses and all orders for this visit:  Mass of left breast, unspecified quadrant -no evidence of breast mass today.  Screening imaging was done in October.  Patient to continue with clinical exams and report any persistent changes.  Left axilla is consistent with an epidermal cyst.  Notify me of any significant changes there.  Warm compresses.

## 2025-06-09 ENCOUNTER — APPOINTMENT (OUTPATIENT)
Dept: ORTHOPEDIC SURGERY | Facility: CLINIC | Age: 65
End: 2025-06-09
Payer: COMMERCIAL

## 2025-07-11 ENCOUNTER — TELEPHONE (OUTPATIENT)
Dept: OBSTETRICS AND GYNECOLOGY | Facility: CLINIC | Age: 65
End: 2025-07-11
Payer: COMMERCIAL

## 2025-07-11 DIAGNOSIS — N76.0 ACUTE VAGINITIS: ICD-10-CM

## 2025-07-11 DIAGNOSIS — R39.9 UTI SYMPTOMS: Primary | ICD-10-CM

## 2025-07-11 RX ORDER — CIPROFLOXACIN 500 MG/1
500 TABLET, FILM COATED ORAL 2 TIMES DAILY
Qty: 10 TABLET | Refills: 0 | Status: SHIPPED | OUTPATIENT
Start: 2025-07-11 | End: 2025-07-16

## 2025-07-11 RX ORDER — FLUCONAZOLE 150 MG/1
150 TABLET ORAL SEE ADMIN INSTRUCTIONS
Qty: 2 TABLET | Refills: 0 | Status: SHIPPED | OUTPATIENT
Start: 2025-07-11

## 2025-07-11 NOTE — TELEPHONE ENCOUNTER
Monica thinks she possibly has a UTI/Yeast infection.  She did take Monistat and had partial relief but thinks it is back.  Does have urine frequency as well.

## 2025-07-15 ENCOUNTER — APPOINTMENT (OUTPATIENT)
Dept: OBSTETRICS AND GYNECOLOGY | Facility: CLINIC | Age: 65
End: 2025-07-15
Payer: COMMERCIAL

## 2025-07-22 ENCOUNTER — APPOINTMENT (OUTPATIENT)
Dept: OBSTETRICS AND GYNECOLOGY | Facility: CLINIC | Age: 65
End: 2025-07-22
Payer: COMMERCIAL

## 2025-07-22 VITALS
BODY MASS INDEX: 25.13 KG/M2 | DIASTOLIC BLOOD PRESSURE: 78 MMHG | SYSTOLIC BLOOD PRESSURE: 122 MMHG | HEIGHT: 60 IN | WEIGHT: 128 LBS

## 2025-07-22 DIAGNOSIS — Z12.31 BREAST CANCER SCREENING BY MAMMOGRAM: ICD-10-CM

## 2025-07-22 DIAGNOSIS — Z01.419 WELL WOMAN EXAM WITH ROUTINE GYNECOLOGICAL EXAM: ICD-10-CM

## 2025-07-22 PROCEDURE — 99396 PREV VISIT EST AGE 40-64: CPT | Performed by: OBSTETRICS & GYNECOLOGY

## 2025-07-22 PROCEDURE — 3008F BODY MASS INDEX DOCD: CPT | Performed by: OBSTETRICS & GYNECOLOGY

## 2025-07-22 PROCEDURE — 88175 CYTOPATH C/V AUTO FLUID REDO: CPT

## 2025-07-22 ASSESSMENT — PAIN SCALES - GENERAL: PAINLEVEL_OUTOF10: 0-NO PAIN

## 2025-07-22 NOTE — PROGRESS NOTES
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  No abnormal vaginal discharge or unexplained vaginal bleeding.  No dysuria, urinary incontinence or frequency.  Denies pelvic pain, dysmenorrhea or dyspareunia.    Eyes:  Denies vision changes, dryness  ENT:  No hearing loss, sinus pain or congestion, nosebleeds  Cardiovascular:  No chest pain or palpitations  Respiratory:  No SOB, cough, wheezing  GI:  No Nausea, vomiting, diarrhea, constipation, abdominal pain  Musculoskeletal:  No new back pain. joint pain, peripheral edema  Skin:  No rash or skin lesion  Neurologic:  No HA, numbness or dizziness  Psychiatric:  No new anxiety or depression  Endocrine:  No loss of hair or hirsutism  Hematologic/lymphatic:  No swollen lymph nodes.  No reported tendency for easy bruising or bleeding    Patient admits to no other systemic complaints      PHYSICAL EXAM:    PSYCH:  Pt is alert, oriented and cooperative    SKIN: warm, dry, w/o lesion    HEAD AND FACE:  External inspection of eyes, ears, functional cranial nerves normal and intact    THYROID:  No thyromegaly    CARDIOVASCULAR:  Warm and well Perfused    PULMONARY:  No respiratory distress    ABDOMEN:  soft, nontender.  No mass or organomegaly.      BREAST:  Breasts are symmetric to inspection and palpation.  No mass palpable bilaterally.  There is no axillary lymphadenopathy    PELVIC:    External genitalia, urethra, perianal region normal to inspection and palpation if indicated.  No inguinal LA    Vagina without  lesions.    Cervix seen and visually normal      Bimanual exam:      No pelvic mass palpable    Uterus nontender, midline in pelvis    No adnexal masses or tenderness  Rectal - no mass    Assessment/Plan    Diagnoses and all orders for this visit:  Well woman exam with routine gynecological exam  Breast cancer screening by mammogram

## 2025-08-05 LAB
CYTOLOGY CMNT CVX/VAG CYTO-IMP: NORMAL
LAB AP HPV GENOTYPE QUESTION: YES
LAB AP HPV HR: NORMAL
LABORATORY COMMENT REPORT: NORMAL
MENSTRUAL HX REPORTED: NORMAL
PATH REPORT.TOTAL CANCER: NORMAL

## 2026-07-28 ENCOUNTER — APPOINTMENT (OUTPATIENT)
Dept: OBSTETRICS AND GYNECOLOGY | Facility: CLINIC | Age: 66
End: 2026-07-28
Payer: COMMERCIAL

## (undated) DEVICE — SUTURE, VICRYL, 1, 27 IN, CT-1 CR, UNDYED

## (undated) DEVICE — BASIN KIT, SINGLE

## (undated) DEVICE — DRAPE, INSTRUMENT, W/POUCH, STERI DRAPE, 7 X 11 IN, DISPOSABLE, STERILE

## (undated) DEVICE — SOLUTION, IRRIGATION, USP, SODIUM CHLORIDE 0.9%, 3000 ML, BAG

## (undated) DEVICE — WOUND SYSTEM, DEBRIDEMENT & CLEANING, O.R DUOPAK

## (undated) DEVICE — HOOD, STERISHIELD T4 SYSTEM

## (undated) DEVICE — SLEEVE, VASO PRESS, CALF GARMENT, MEDIUM, GREEN

## (undated) DEVICE — PAD, KNEE PATIENT, DEMAYO, DISP, STERILE

## (undated) DEVICE — GLOVE, SURGICAL, PROTEXIS PI W/NEU-THERA, 8.0, PF, LF

## (undated) DEVICE — GOWN, SURGICAL, NON-REINFORCED, XLARGE, LF

## (undated) DEVICE — NEEDLE, HYPODERMIC, SPECIALTY, REGULAR WALL, SHORT BEVEL, 18 G X 1.5 IN

## (undated) DEVICE — TIP, SUCTION, YANKAUER, FLEXIBLE

## (undated) DEVICE — SUTURE, VICRYL, 2-0, 36 IN, CT-1, UNDYED

## (undated) DEVICE — Device

## (undated) DEVICE — SOLUTION, IRRIGATION, STERILE WATER, 1000 ML, POUR BOTTLE

## (undated) DEVICE — HIGH FLOW TIP FOR INTERPULSE HANDPIECE SET

## (undated) DEVICE — SYRINGE, 60 CC, LUER LOCK, MONOJECT, W/O CAP, LF

## (undated) DEVICE — SUTURE, STRATAFIX, 1 SYMMETRIC, PDS PLUS, 60CM, CTX, VIOLET

## (undated) DEVICE — SUTURE, MONOCRYL, 3-0, PS-1 27IN, UNDYED

## (undated) DEVICE — INTERPULSE HANDPIECE SET W/ 10FT SUCTION TUBING

## (undated) DEVICE — GLOVE, SURGICAL, PROTEXIS PI , 7.5, PF, LF

## (undated) DEVICE — MIXER, CEMENT, MIXEVAC III HIGH VACUUM KIT, STERILE

## (undated) DEVICE — GLOVE, SURGICAL, PROTEXIS PI BLUE W/NEUTHERA, 7.5, PF, LF

## (undated) DEVICE — BLADE, STRYKER, OSC, 19 X 90 X 1.27G

## (undated) DEVICE — BANDAGE, COMPRESSION, W/CLIP, FLEX-MASTER, DOUBLE LENGTH, 6 IN X 11 YD, LF

## (undated) DEVICE — IRRIGATION SET, CYSTOSCOPY, REGULATING CLAMP, STRAIGHT, 81 IN

## (undated) DEVICE — TOWEL PACK, STERILE, 4/PACK, BLUE

## (undated) DEVICE — MARKER, SKIN, DUAL TIP, W/RULER AND LABEL

## (undated) DEVICE — DRESSING, AQUACEL AG, HYDROFIBER W/SILVER, 3.5 X 12 IN